# Patient Record
Sex: MALE | Race: WHITE | NOT HISPANIC OR LATINO | Employment: OTHER | URBAN - METROPOLITAN AREA
[De-identification: names, ages, dates, MRNs, and addresses within clinical notes are randomized per-mention and may not be internally consistent; named-entity substitution may affect disease eponyms.]

---

## 2017-01-26 RX ORDER — APIXABAN 5 MG/1
TABLET, FILM COATED ORAL
Qty: 180 TABLET | Refills: 1 | Status: SHIPPED | OUTPATIENT
Start: 2017-01-26 | End: 2017-07-25 | Stop reason: SDUPTHER

## 2017-02-28 ENCOUNTER — TELEPHONE (OUTPATIENT)
Dept: CARDIOLOGY | Facility: CLINIC | Age: 82
End: 2017-02-28

## 2017-03-02 NOTE — TELEPHONE ENCOUNTER
Just confirming with you that it is OK to hold his Eliquis 2 days prior to his implant procedure.

## 2017-05-16 ENCOUNTER — OFFICE VISIT (OUTPATIENT)
Dept: CARDIOLOGY | Facility: CLINIC | Age: 82
End: 2017-05-16

## 2017-05-16 VITALS
HEART RATE: 101 BPM | BODY MASS INDEX: 24.86 KG/M2 | WEIGHT: 158.4 LBS | HEIGHT: 67 IN | DIASTOLIC BLOOD PRESSURE: 68 MMHG | SYSTOLIC BLOOD PRESSURE: 118 MMHG

## 2017-05-16 DIAGNOSIS — I63.9 ACUTE CVA (CEREBROVASCULAR ACCIDENT) (HCC): ICD-10-CM

## 2017-05-16 DIAGNOSIS — Z98.890 HISTORY OF LOOP RECORDER: ICD-10-CM

## 2017-05-16 DIAGNOSIS — E78.5 HYPERLIPIDEMIA LDL GOAL <100: Primary | ICD-10-CM

## 2017-05-16 DIAGNOSIS — I25.2 H/O ACUTE MYOCARDIAL INFARCTION: ICD-10-CM

## 2017-05-16 PROCEDURE — 99213 OFFICE O/P EST LOW 20 MIN: CPT | Performed by: NURSE PRACTITIONER

## 2017-07-25 RX ORDER — APIXABAN 5 MG/1
TABLET, FILM COATED ORAL
Qty: 180 TABLET | Refills: 0 | Status: SHIPPED | OUTPATIENT
Start: 2017-07-25 | End: 2017-10-23 | Stop reason: SDUPTHER

## 2017-10-23 RX ORDER — APIXABAN 5 MG/1
TABLET, FILM COATED ORAL
Qty: 180 TABLET | Refills: 0 | Status: SHIPPED | OUTPATIENT
Start: 2017-10-23 | End: 2017-11-21 | Stop reason: SDUPTHER

## 2017-11-21 ENCOUNTER — OFFICE VISIT (OUTPATIENT)
Dept: CARDIOLOGY | Facility: CLINIC | Age: 82
End: 2017-11-21

## 2017-11-21 VITALS
BODY MASS INDEX: 24.71 KG/M2 | SYSTOLIC BLOOD PRESSURE: 130 MMHG | DIASTOLIC BLOOD PRESSURE: 74 MMHG | HEIGHT: 68 IN | WEIGHT: 163 LBS | HEART RATE: 72 BPM

## 2017-11-21 DIAGNOSIS — E78.5 HYPERLIPIDEMIA LDL GOAL <100: ICD-10-CM

## 2017-11-21 DIAGNOSIS — Z98.890 HISTORY OF LOOP RECORDER: ICD-10-CM

## 2017-11-21 DIAGNOSIS — I63.9 ACUTE CVA (CEREBROVASCULAR ACCIDENT) (HCC): ICD-10-CM

## 2017-11-21 DIAGNOSIS — I25.10 CORONARY ARTERY DISEASE INVOLVING NATIVE CORONARY ARTERY OF NATIVE HEART WITHOUT ANGINA PECTORIS: ICD-10-CM

## 2017-11-21 DIAGNOSIS — I48.0 PAROXYSMAL ATRIAL FIBRILLATION (HCC): Primary | ICD-10-CM

## 2017-11-21 PROCEDURE — 93291 INTERROG DEV EVAL SCRMS IP: CPT | Performed by: INTERNAL MEDICINE

## 2017-11-21 PROCEDURE — 99214 OFFICE O/P EST MOD 30 MIN: CPT | Performed by: INTERNAL MEDICINE

## 2017-11-21 RX ORDER — ATORVASTATIN CALCIUM 40 MG/1
40 TABLET, FILM COATED ORAL NIGHTLY
Qty: 90 TABLET | Refills: 3 | Status: SHIPPED | OUTPATIENT
Start: 2017-11-21 | End: 2018-11-16

## 2017-11-21 NOTE — PROGRESS NOTES
Encounter Date:11/21/2017    Patient ID: Eric Myrick is a  82 y.o. male who resides in [place], [place].    CC/Reason for visit:  Atrial Fibrillation and Hyperlipidemia            Eric Myrick returns to my office in follow up of his atrial fibrillation and hypertension. Since last visit, patient has been doing well from a cardiovascular standpoint.  He denies angina, heart failure symptoms, palpitations, or TIA or stroke symptoms.  He is here for his Loop recorder interrogation which showed no evidence of atrial fibrillation since his last check.  He states that he has had more energy recently that he has in the past.    Review of Systems   Constitution: Negative. Negative for weakness and malaise/fatigue.   HENT: Negative.    Eyes: Negative.  Negative for vision loss in left eye and vision loss in right eye.   Cardiovascular: Negative.  Negative for chest pain, dyspnea on exertion, near-syncope, orthopnea, palpitations, paroxysmal nocturnal dyspnea and syncope.   Respiratory: Negative.    Endocrine: Negative.    Hematologic/Lymphatic: Negative.    Skin: Negative.    Musculoskeletal: Negative.  Negative for myalgias.   Gastrointestinal: Negative.    Genitourinary: Negative.    Neurological: Negative.  Negative for brief paralysis, excessive daytime sleepiness, focal weakness, numbness and paresthesias.   Psychiatric/Behavioral: Negative.    Allergic/Immunologic: Negative.    All other systems reviewed and are negative.      The patient's past medical, social, family history and ROS reviewed in the patient's electronic medical record.    Allergies  Review of patient's allergies indicates no known allergies.    Outpatient Prescriptions Marked as Taking for the 11/21/17 encounter (Office Visit) with Manuel Ragsdale IV, MD   Medication Sig Dispense Refill   • apixaban (ELIQUIS) 5 MG tablet tablet Take 1 tablet by mouth Every 12 (Twelve) Hours for 360 days. 180 tablet 3   • atorvastatin  "(LIPITOR) 40 MG tablet Take 1 tablet by mouth Every Night for 360 days. 90 tablet 3   • omeprazole (PriLOSEC) 40 MG capsule Every Night.     • [DISCONTINUED] atorvastatin (LIPITOR) 40 MG tablet Daily.     • [DISCONTINUED] ELIQUIS 5 MG tablet tablet TAKE 1 TABLET TWICE A  tablet 0         Blood pressure 130/74, pulse 72, height 68\" (172.7 cm), weight 163 lb (73.9 kg).  Body mass index is 24.78 kg/(m^2).    Physical Exam   Constitutional: He is oriented to person, place, and time. He appears well-developed and well-nourished.   HENT:   Head: Normocephalic and atraumatic.   Eyes: Pupils are equal, round, and reactive to light. No scleral icterus.   Neck: No JVD present. Carotid bruit is not present. No thyromegaly present.   Cardiovascular: Normal rate, regular rhythm, S1 normal and S2 normal.  Exam reveals no gallop.    No murmur heard.  Pulmonary/Chest: Effort normal and breath sounds normal.   Abdominal: Soft. There is no hepatosplenomegaly. There is no tenderness.   Neurological: He is alert and oriented to person, place, and time.   Skin: Skin is warm and dry. No cyanosis. Nails show no clubbing.   Psychiatric: He has a normal mood and affect. His behavior is normal.       Data Review:   Procedures     DEVICE INTERROGATION:  11/21/2017, loop recorder interrogation revealed no episodes of atrial fibrillation since last interrogation.         Problem List Items Addressed This Visit        Cardiovascular and Mediastinum    Paroxysmal atrial fibrillation - Primary    Overview     · Confirmed on loop recorder, 2015  · Chads VASC = 5 (age > 75, CVA, CAD)         Current Assessment & Plan     · Asymptomatic  · Continue Eliquis for stroke prophylaxis         Relevant Medications    apixaban (ELIQUIS) 5 MG tablet tablet    Coronary artery disease involving native coronary artery of native heart    Overview     · Cardiac catheterization for acute MI, [place], 1992: Angioplasty only.          Current Assessment & " Plan     · No anginal symptoms  · Continue statin therapy         History of cardioembolic CVA    Overview     · MCA distribution CVA, 11/2014  · Loop recorder placement, 11 2014  · Confirmation of atrial fibrillation on loop recorder interrogation 2015 with initiation of Eliquis         Hyperlipidemia LDL goal <100    Overview     · Recommend moderate intensity statin therapy for stroke prevention         Current Assessment & Plan     · Continue atorvastatin         Relevant Medications    atorvastatin (LIPITOR) 40 MG tablet       Other    History of loop recorder    Overview     · 11/30/2014: 2 episodes of brief atrial fibrillation             Patient is presently asymptomatic for atrial fibrillation, angina, or heart failure.  He is tolerating Eliquis for stroke prophylaxis without bleeding complications.  Loop recorder patient today reveals no atrial fibrillation since last check.       · Continue Eliquis 5 mg twice a day for stroke prophylaxis  · Continue atorvastatin  · Return in about 6 months (around 5/21/2018).      Manuel Ragsdale IV, MD  11/21/2017     Scribed for Manuel Ragsdale IV, MD by Damien Stevens. 11/21/2017  5:34 PM

## 2018-05-22 ENCOUNTER — OFFICE VISIT (OUTPATIENT)
Dept: CARDIOLOGY | Facility: CLINIC | Age: 83
End: 2018-05-22

## 2018-05-22 VITALS
DIASTOLIC BLOOD PRESSURE: 60 MMHG | HEIGHT: 66 IN | HEART RATE: 79 BPM | BODY MASS INDEX: 26.03 KG/M2 | WEIGHT: 162 LBS | SYSTOLIC BLOOD PRESSURE: 142 MMHG

## 2018-05-22 DIAGNOSIS — Z98.890 HISTORY OF LOOP RECORDER: Primary | ICD-10-CM

## 2018-05-22 DIAGNOSIS — E78.5 HYPERLIPIDEMIA LDL GOAL <100: ICD-10-CM

## 2018-05-22 DIAGNOSIS — I25.10 CORONARY ARTERY DISEASE INVOLVING NATIVE CORONARY ARTERY OF NATIVE HEART WITHOUT ANGINA PECTORIS: ICD-10-CM

## 2018-05-22 DIAGNOSIS — I48.0 PAROXYSMAL ATRIAL FIBRILLATION (HCC): ICD-10-CM

## 2018-05-22 PROCEDURE — 99214 OFFICE O/P EST MOD 30 MIN: CPT | Performed by: NURSE PRACTITIONER

## 2018-05-22 NOTE — ASSESSMENT & PLAN NOTE
· No atrial fibrillation detected on Loop recorder interrogation today.  · Device interrogation in 6 months

## 2018-05-22 NOTE — ASSESSMENT & PLAN NOTE
· No signs or symptoms of TIA or stroke  · Continue Elliquis 5 mg twice a day  · No atrial fibrillation detected on loop recorder interrogation

## 2018-07-29 ENCOUNTER — APPOINTMENT (OUTPATIENT)
Dept: GENERAL RADIOLOGY | Facility: HOSPITAL | Age: 83
End: 2018-07-29

## 2018-07-29 ENCOUNTER — HOSPITAL ENCOUNTER (EMERGENCY)
Facility: HOSPITAL | Age: 83
Discharge: HOME | End: 2018-07-29
Attending: EMERGENCY MEDICINE | Admitting: EMERGENCY MEDICINE

## 2018-07-29 VITALS
HEIGHT: 66 IN | HEART RATE: 71 BPM | OXYGEN SATURATION: 95 % | SYSTOLIC BLOOD PRESSURE: 153 MMHG | DIASTOLIC BLOOD PRESSURE: 73 MMHG | BODY MASS INDEX: 25.23 KG/M2 | WEIGHT: 157 LBS | RESPIRATION RATE: 18 BRPM | TEMPERATURE: 98.2 F

## 2018-07-29 DIAGNOSIS — R55 NEAR SYNCOPE: Primary | ICD-10-CM

## 2018-07-29 LAB
ALBUMIN SERPL-MCNC: 3.99 G/DL (ref 3.2–4.8)
ALBUMIN/GLOB SERPL: 1.4 G/DL (ref 1.5–2.5)
ALP SERPL-CCNC: 123 U/L (ref 25–100)
ALT SERPL W P-5'-P-CCNC: 10 U/L (ref 7–40)
ANION GAP SERPL CALCULATED.3IONS-SCNC: 6 MMOL/L (ref 3–11)
AST SERPL-CCNC: 15 U/L (ref 0–33)
BASOPHILS # BLD AUTO: 0.04 10*3/MM3 (ref 0–0.2)
BASOPHILS NFR BLD AUTO: 0.5 % (ref 0–1)
BILIRUB SERPL-MCNC: 0.8 MG/DL (ref 0.3–1.2)
BILIRUB UR QL STRIP: NEGATIVE
BUN BLD-MCNC: 16 MG/DL (ref 9–23)
BUN/CREAT SERPL: 19.3 (ref 7–25)
CALCIUM SPEC-SCNC: 8.9 MG/DL (ref 8.7–10.4)
CHLORIDE SERPL-SCNC: 107 MMOL/L (ref 99–109)
CLARITY UR: CLEAR
CO2 SERPL-SCNC: 28 MMOL/L (ref 20–31)
COLOR UR: YELLOW
CREAT BLD-MCNC: 0.83 MG/DL (ref 0.6–1.3)
DEPRECATED RDW RBC AUTO: 46.4 FL (ref 37–54)
EOSINOPHIL # BLD AUTO: 0.18 10*3/MM3 (ref 0–0.3)
EOSINOPHIL NFR BLD AUTO: 2.2 % (ref 0–3)
ERYTHROCYTE [DISTWIDTH] IN BLOOD BY AUTOMATED COUNT: 13.4 % (ref 11.3–14.5)
GFR SERPL CREATININE-BSD FRML MDRD: 89 ML/MIN/1.73
GLOBULIN UR ELPH-MCNC: 2.8 GM/DL
GLUCOSE BLD-MCNC: 111 MG/DL (ref 70–100)
GLUCOSE UR STRIP-MCNC: NEGATIVE MG/DL
HCT VFR BLD AUTO: 45.1 % (ref 38.9–50.9)
HGB BLD-MCNC: 14.6 G/DL (ref 13.1–17.5)
HGB UR QL STRIP.AUTO: NEGATIVE
HOLD SPECIMEN: NORMAL
HOLD SPECIMEN: NORMAL
IMM GRANULOCYTES # BLD: 0.01 10*3/MM3 (ref 0–0.03)
IMM GRANULOCYTES NFR BLD: 0.1 % (ref 0–0.6)
KETONES UR QL STRIP: NEGATIVE
LEUKOCYTE ESTERASE UR QL STRIP.AUTO: NEGATIVE
LYMPHOCYTES # BLD AUTO: 2.52 10*3/MM3 (ref 0.6–4.8)
LYMPHOCYTES NFR BLD AUTO: 31.1 % (ref 24–44)
MAGNESIUM SERPL-MCNC: 2.6 MG/DL (ref 1.3–2.7)
MCH RBC QN AUTO: 30.6 PG (ref 27–31)
MCHC RBC AUTO-ENTMCNC: 32.4 G/DL (ref 32–36)
MCV RBC AUTO: 94.5 FL (ref 80–99)
MONOCYTES # BLD AUTO: 0.6 10*3/MM3 (ref 0–1)
MONOCYTES NFR BLD AUTO: 7.4 % (ref 0–12)
NEUTROPHILS # BLD AUTO: 4.75 10*3/MM3 (ref 1.5–8.3)
NEUTROPHILS NFR BLD AUTO: 58.7 % (ref 41–71)
NITRITE UR QL STRIP: NEGATIVE
PH UR STRIP.AUTO: 6.5 [PH] (ref 5–8)
PLATELET # BLD AUTO: 183 10*3/MM3 (ref 150–450)
PMV BLD AUTO: 10.2 FL (ref 6–12)
POTASSIUM BLD-SCNC: 4.1 MMOL/L (ref 3.5–5.5)
PROT SERPL-MCNC: 6.8 G/DL (ref 5.7–8.2)
PROT UR QL STRIP: NEGATIVE
RBC # BLD AUTO: 4.77 10*6/MM3 (ref 4.2–5.76)
SODIUM BLD-SCNC: 141 MMOL/L (ref 132–146)
SP GR UR STRIP: 1.02 (ref 1–1.03)
TROPONIN I SERPL-MCNC: 0 NG/ML (ref 0–0.07)
TROPONIN I SERPL-MCNC: 0 NG/ML (ref 0–0.07)
UROBILINOGEN UR QL STRIP: NORMAL
WBC NRBC COR # BLD: 8.1 10*3/MM3 (ref 3.5–10.8)
WHOLE BLOOD HOLD SPECIMEN: NORMAL
WHOLE BLOOD HOLD SPECIMEN: NORMAL

## 2018-07-29 PROCEDURE — 80053 COMPREHEN METABOLIC PANEL: CPT | Performed by: EMERGENCY MEDICINE

## 2018-07-29 PROCEDURE — 71045 X-RAY EXAM CHEST 1 VIEW: CPT

## 2018-07-29 PROCEDURE — 81003 URINALYSIS AUTO W/O SCOPE: CPT | Performed by: EMERGENCY MEDICINE

## 2018-07-29 PROCEDURE — 93005 ELECTROCARDIOGRAM TRACING: CPT | Performed by: EMERGENCY MEDICINE

## 2018-07-29 PROCEDURE — 83735 ASSAY OF MAGNESIUM: CPT | Performed by: EMERGENCY MEDICINE

## 2018-07-29 PROCEDURE — 36415 COLL VENOUS BLD VENIPUNCTURE: CPT

## 2018-07-29 PROCEDURE — 84484 ASSAY OF TROPONIN QUANT: CPT

## 2018-07-29 PROCEDURE — 99285 EMERGENCY DEPT VISIT HI MDM: CPT

## 2018-07-29 PROCEDURE — 85025 COMPLETE CBC W/AUTO DIFF WBC: CPT | Performed by: EMERGENCY MEDICINE

## 2018-07-29 RX ORDER — SODIUM CHLORIDE 0.9 % (FLUSH) 0.9 %
10 SYRINGE (ML) INJECTION AS NEEDED
Status: DISCONTINUED | OUTPATIENT
Start: 2018-07-29 | End: 2018-07-29 | Stop reason: HOSPADM

## 2019-02-10 ENCOUNTER — APPOINTMENT (OUTPATIENT)
Dept: CT IMAGING | Facility: HOSPITAL | Age: 84
End: 2019-02-10

## 2019-02-10 ENCOUNTER — HOSPITAL ENCOUNTER (EMERGENCY)
Facility: HOSPITAL | Age: 84
Discharge: HOME | End: 2019-02-10
Attending: EMERGENCY MEDICINE | Admitting: EMERGENCY MEDICINE

## 2019-02-10 VITALS
HEIGHT: 67 IN | WEIGHT: 160 LBS | HEART RATE: 79 BPM | SYSTOLIC BLOOD PRESSURE: 162 MMHG | OXYGEN SATURATION: 95 % | DIASTOLIC BLOOD PRESSURE: 78 MMHG | RESPIRATION RATE: 20 BRPM | TEMPERATURE: 98.3 F | BODY MASS INDEX: 25.11 KG/M2

## 2019-02-10 DIAGNOSIS — S00.03XA CONTUSION OF SCALP, INITIAL ENCOUNTER: ICD-10-CM
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
## 2019-02-10 DIAGNOSIS — S01.81XA FACE LACERATIONS, INITIAL ENCOUNTER: Primary | ICD-10-CM

## 2019-02-10 DIAGNOSIS — Z79.01 ANTICOAGULATED: ICD-10-CM

## 2019-02-10 PROCEDURE — 99283 EMERGENCY DEPT VISIT LOW MDM: CPT

## 2019-02-10 PROCEDURE — 70450 CT HEAD/BRAIN W/O DYE: CPT

## 2019-02-10 RX ORDER — LIDOCAINE HYDROCHLORIDE 10 MG/ML
5 INJECTION, SOLUTION EPIDURAL; INFILTRATION; INTRACAUDAL; PERINEURAL ONCE
Status: COMPLETED | OUTPATIENT
Start: 2019-02-10 | End: 2019-02-10

## 2019-02-10 RX ORDER — FUROSEMIDE 10 MG/ML
40 INJECTION INTRAMUSCULAR; INTRAVENOUS ONCE
Status: DISCONTINUED | OUTPATIENT
Start: 2019-02-10 | End: 2019-02-10

## 2019-02-10 RX ORDER — ATORVASTATIN CALCIUM 40 MG/1
40 TABLET, FILM COATED ORAL DAILY
COMMUNITY
End: 2019-03-12 | Stop reason: SDUPTHER

## 2019-02-10 RX ADMIN — LIDOCAINE HYDROCHLORIDE 5 ML: 10 INJECTION, SOLUTION EPIDURAL; INFILTRATION; INTRACAUDAL; PERINEURAL at 21:12

## 2019-02-11 NOTE — ED PROVIDER NOTES
Subjective   83-year-old male presents to the emergency department for evaluation after a fall.  The patient states he slipped on wet grass and hit his left forehead against the sidewalk.  He sustained a small laceration over the right brow.  No loss of consciousness.  No headache.  He denies any neck pain or back pain or extremity pain.  The patient is on Eliquis.  He has a history of prior CVA 4 years ago and one brief episode of atrial fibrillation.  He has had previous MI 30 yrs ago.              Review of Systems    Past Medical History:   Diagnosis Date   • Acute CVA (cerebrovascular accident) (CMS/HCC) 11/20/2014   • Atrial fibrillation (CMS/HCC)    • Former tobacco use    • GERD (gastroesophageal reflux disease)    • H/O acute myocardial infarction 12/17/1992   • History of loop recorder 11/30/2014    TWO EPISODES OF BRIEF ATRIAL FIBRILLATION   • Hyperlipidemia    • Obstructive sleep apnea on CPAP        No Known Allergies    Past Surgical History:   Procedure Laterality Date   • CORONARY ANGIOPLASTY Left 12/17/1992    ANGIOPLASTY ONLY; DATA DEFICIT   • HERNIA REPAIR Bilateral    • THROMBECTOMY         Family History   Problem Relation Age of Onset   • Cancer Mother    • No Known Problems Father        Social History     Socioeconomic History   • Marital status:      Spouse name: Not on file   • Number of children: Not on file   • Years of education: Not on file   • Highest education level: Not on file   Tobacco Use   • Smoking status: Former Smoker     Types: Cigarettes   • Smokeless tobacco: Never Used   • Tobacco comment: quit 45 years ago   Substance and Sexual Activity   • Alcohol use: No   • Drug use: No   • Sexual activity: Defer           Objective   Physical Exam    Laceration Repair  Date/Time: 2/10/2019 9:47 PM  Performed by: Joaquim Birmingham PA  Authorized by: Kedar Sims MD     Consent:     Consent obtained:  Verbal    Consent given by:  Patient    Risks discussed:  Pain, poor  cosmetic result and poor wound healing  Anesthesia (see MAR for exact dosages):     Anesthesia method:  Local infiltration    Local anesthetic:  Lidocaine 1% w/o epi  Laceration details:     Location:  Face    Face location:  L eyebrow    Length (cm):  2.5  Repair type:     Repair type:  Simple  Pre-procedure details:     Preparation:  Patient was prepped and draped in usual sterile fashion  Exploration:     Hemostasis achieved with:  Direct pressure    Wound exploration: wound explored through full range of motion      Contaminated: no    Treatment:     Area cleansed with:  Betadine    Amount of cleaning:  Standard    Irrigation solution:  Sterile saline    Irrigation method:  Syringe  Skin repair:     Repair method:  Sutures    Suture size:  5-0    Suture material:  Nylon    Suture technique:  Simple interrupted    Number of sutures:  6  Approximation:     Approximation:  Close    Vermilion border: well-aligned    Post-procedure details:     Dressing:  Sterile dressing    Patient tolerance of procedure:  Tolerated well, no immediate complications               ED Course      CT head shows no acute fx or bleed.  The wound was closed (see procedure note).  Will d/c home to f/u as needed.  No results found for this or any previous visit (from the past 24 hour(s)).  Note: In addition to lab results from this visit, the labs listed above may include labs taken at another facility or during a different encounter within the last 24 hours. Please correlate lab times with ED admission and discharge times for further clarification of the services performed during this visit.    CT Head Without Contrast   Final Result   No evidence for acute transcortical infarct, acute intracranial hemorrhage, or    mass effect.       THIS DOCUMENT HAS BEEN ELECTRONICALLY SIGNED BY CLAUDIA CRUZ MD        Vitals:    02/10/19 1959   BP: (!) 185/91   Pulse: 85   Resp: 20   Temp: 98.3 °F (36.8 °C)   SpO2: 94%   Weight: 72.6 kg (160 lb)   Height:  "170.2 cm (67\")     Medications   lidocaine PF 1% (XYLOCAINE) injection 5 mL (5 mL Injection Given 2/10/19 2112)     ECG/EMG Results (last 24 hours)     ** No results found for the last 24 hours. **        No orders to display                   MDM      Final diagnoses:   Face lacerations, initial encounter   Contusion of scalp, initial encounter   Fall, initial encounter   Anticoagulated            Joaquim Birmingham, PA  02/10/19 6539    "

## 2019-02-18 ENCOUNTER — HOSPITAL ENCOUNTER (EMERGENCY)
Facility: HOSPITAL | Age: 84
Discharge: HOME | End: 2019-02-18

## 2019-02-18 VITALS
TEMPERATURE: 98 F | HEIGHT: 67 IN | HEART RATE: 75 BPM | OXYGEN SATURATION: 98 % | DIASTOLIC BLOOD PRESSURE: 73 MMHG | BODY MASS INDEX: 25.11 KG/M2 | WEIGHT: 160 LBS | SYSTOLIC BLOOD PRESSURE: 120 MMHG | RESPIRATION RATE: 16 BRPM

## 2019-02-18 PROCEDURE — 99201: CPT

## 2019-03-08 NOTE — PROGRESS NOTES
[facility] at [facility]  Outpatient Follow-up Visit    Eric Myrick  1935  PCP: Ranjit Caban MD      ID:  Eric Myrick is a 83 y.o. male who is  and resides in [place], [place].    Chief Complaint   Patient presents with   • Atrial Fibrillation   • Cerebrovascular Accident            The patient returns today for his 6 month follow up of his atrial fibrillation, history of coronary artery disease, and hyperlipidemia.  The patient has been doing well from a cardiovascular standpoint.  He has had no recurrent CVA symptoms since starting Eliquis.  He denies exertional chest pain.  His loop recorder battery has  and no longer is transmitting.      No Known Allergies    Current Outpatient Medications:   •  apixaban (ELIQUIS) 5 MG tablet tablet, Take 1 tablet by mouth Every 12 (Twelve) Hours., Disp: 180 tablet, Rfl: 3  •  atorvastatin (LIPITOR) 40 MG tablet, Take 1 tablet by mouth Daily., Disp: , Rfl:   •  B Complex Vitamins (VITAMIN B COMPLEX PO), Take  by mouth Daily., Disp: , Rfl:   •  Multiple Vitamins-Minerals (MULTIVITAMIN ADULT PO), Take  by mouth Daily., Disp: , Rfl:   •  omeprazole (PriLOSEC) 40 MG capsule, Every Night., Disp: , Rfl:     Past Medical History, Past Surgical History, Family history, Social History, and Medications were all reviewed with the patient today and updated as necessary.     Past Medical History:   Diagnosis Date   • Acute CVA (cerebrovascular accident) (CMS/HCC) 2014   • Atrial fibrillation (CMS/HCC)    • Former tobacco use    • GERD (gastroesophageal reflux disease)    • H/O acute myocardial infarction 1992   • History of loop recorder 2014    TWO EPISODES OF BRIEF ATRIAL FIBRILLATION   • Hyperlipidemia    • Obstructive sleep apnea on CPAP      Past Surgical History:   Procedure Laterality Date   • CORONARY ANGIOPLASTY Left 1992    ANGIOPLASTY ONLY; DATA DEFICIT   • HERNIA REPAIR Bilateral    •  "THROMBECTOMY       Family History   Problem Relation Age of Onset   • Cancer Mother    • No Known Problems Father      Social History     Tobacco Use   • Smoking status: Former Smoker     Types: Cigarettes   • Smokeless tobacco: Never Used   • Tobacco comment: quit 45 years ago   Substance Use Topics   • Alcohol use: No       Review of Systems   Constitution: Negative for weakness and malaise/fatigue.   HENT: Positive for ear pain.    Eyes: Negative for vision loss in left eye and vision loss in right eye.   Cardiovascular: Negative for chest pain, dyspnea on exertion, near-syncope, orthopnea, palpitations, paroxysmal nocturnal dyspnea and syncope.   Musculoskeletal: Positive for arthritis and back pain. Negative for myalgias.   Neurological: Negative for brief paralysis, excessive daytime sleepiness, focal weakness, numbness and paresthesias.   All other systems reviewed and are negative.              /72 (BP Location: Right arm, Patient Position: Sitting)   Pulse 74   Ht 170.2 cm (67\")   Wt 74.8 kg (164 lb 12.8 oz)   BMI 25.81 kg/m²        Wt Readings from Last 5 Encounters:   03/12/19 74.8 kg (164 lb 12.8 oz)   02/18/19 72.6 kg (160 lb)   02/10/19 72.6 kg (160 lb)   07/29/18 71.2 kg (157 lb)   06/14/18 73.5 kg (162 lb)       BP Readings from Last 5 Encounters:   03/12/19 130/72   02/18/19 120/73   02/10/19 162/78   07/29/18 153/73   06/14/18 144/75       Physical Exam   Constitutional: He is oriented to person, place, and time. He appears well-developed and well-nourished.   HENT:   Head: Normocephalic and atraumatic.   Eyes: Pupils are equal, round, and reactive to light. No scleral icterus.   Neck: No JVD present. Carotid bruit is not present. No thyromegaly present.   Cardiovascular: Normal rate, regular rhythm, S1 normal and S2 normal. Exam reveals no gallop.   No murmur heard.  Pulmonary/Chest: Effort normal and breath sounds normal.   Abdominal: Soft. There is no hepatosplenomegaly. There is no " tenderness.   Neurological: He is alert and oriented to person, place, and time.   Skin: Skin is warm and dry. No cyanosis. Nails show no clubbing.   Psychiatric: He has a normal mood and affect. His behavior is normal.       Procedures       Lab Results   Component Value Date    GLUCOSE 111 (H) 07/29/2018    BUN 16 07/29/2018    CREATININE 0.83 07/29/2018    EGFRIFNONA 89 07/29/2018    BCR 19.3 07/29/2018    K 4.1 07/29/2018    CO2 28.0 07/29/2018    CALCIUM 8.9 07/29/2018    ALBUMIN 3.99 07/29/2018    AST 15 07/29/2018    ALT 10 07/29/2018          Problem List Items Addressed This Visit        Cardiology Problems    Paroxysmal atrial fibrillation (CMS/HCC) - Primary    Overview     · Confirmed on loop recorder, 2015  · Chads VASC = 5 (age > 75, CVA, CAD)         Current Assessment & Plan     · Asymptomatic  · Continue Eliquis for stroke prophylaxis         Relevant Medications    apixaban (ELIQUIS) 5 MG tablet tablet    Hyperlipidemia LDL goal <100    Overview     · Recommend moderate intensity statin therapy for stroke prevention         Current Assessment & Plan     Continue atorvastatin         Relevant Medications    atorvastatin (LIPITOR) 40 MG tablet               · Continue present medical therapy including Eliquis and atorvastatin  Return in about 6 months (around 9/12/2019).          MILADIS Ragsdale M.D., [facility], [facility]  Interventional Cardiology  3/12/2019  5:38 PM

## 2019-03-12 ENCOUNTER — OFFICE VISIT (OUTPATIENT)
Dept: CARDIOLOGY | Facility: CLINIC | Age: 84
End: 2019-03-12

## 2019-03-12 VITALS
BODY MASS INDEX: 25.87 KG/M2 | WEIGHT: 164.8 LBS | HEIGHT: 67 IN | SYSTOLIC BLOOD PRESSURE: 130 MMHG | HEART RATE: 74 BPM | DIASTOLIC BLOOD PRESSURE: 72 MMHG

## 2019-03-12 DIAGNOSIS — E78.5 HYPERLIPIDEMIA LDL GOAL <100: ICD-10-CM

## 2019-03-12 DIAGNOSIS — I48.0 PAROXYSMAL ATRIAL FIBRILLATION (HCC): Primary | ICD-10-CM

## 2019-03-12 PROCEDURE — 99213 OFFICE O/P EST LOW 20 MIN: CPT | Performed by: INTERNAL MEDICINE

## 2019-03-12 RX ORDER — ATORVASTATIN CALCIUM 40 MG/1
40 TABLET, FILM COATED ORAL DAILY
Start: 2019-03-12 | End: 2021-09-28 | Stop reason: SDUPTHER

## 2019-03-15 DIAGNOSIS — I48.0 PAROXYSMAL ATRIAL FIBRILLATION (HCC): ICD-10-CM

## 2019-03-25 ENCOUNTER — OFFICE VISIT (OUTPATIENT)
Dept: ORTHOPEDIC SURGERY | Facility: CLINIC | Age: 84
End: 2019-03-25

## 2019-03-25 VITALS — BODY MASS INDEX: 25.95 KG/M2 | HEIGHT: 67 IN | OXYGEN SATURATION: 95 % | HEART RATE: 85 BPM | WEIGHT: 165.34 LBS

## 2019-03-25 DIAGNOSIS — M16.12 PRIMARY OSTEOARTHRITIS OF LEFT HIP: Primary | ICD-10-CM

## 2019-03-25 PROCEDURE — 99203 OFFICE O/P NEW LOW 30 MIN: CPT | Performed by: ORTHOPAEDIC SURGERY

## 2019-03-25 NOTE — PROGRESS NOTES
[facility] Note    Subjective     Chief Complaint   Patient presents with   • Left Hip - Pain        HPI    Eric Myrick is a 83 y.o. male.  He presents today for evaluation of left hip pain.  Pain has been bothering him for approximately the past 2 years, following no injury.  The pain is associated with stiffness, and worsens with walking and climbing stairs.  It is relieved somewhat by sitting or lying down.  The pain is 5 out of 10 currently, and is throbbing in nature.  He uses a cane for ambulation, and has tried anti-inflammatories.  He is not interested in surgical intervention at this time.      Patient Active Problem List   Diagnosis   • History of cardioembolic CVA   • History of loop recorder   • Coronary artery disease involving native coronary artery of native heart without angina pectoris   • Hyperlipidemia LDL goal <100   • GERD (gastroesophageal reflux disease)   • Obstructive sleep apnea on CPAP   • Paroxysmal atrial fibrillation (CMS/HCC)     Past Medical History:   Diagnosis Date   • Acute CVA (cerebrovascular accident) (CMS/HCC) 11/20/2014   • Atrial fibrillation (CMS/HCC)    • Former tobacco use    • GERD (gastroesophageal reflux disease)    • H/O acute myocardial infarction 12/17/1992   • History of loop recorder 11/30/2014    TWO EPISODES OF BRIEF ATRIAL FIBRILLATION   • Hyperlipidemia    • Obstructive sleep apnea on CPAP       Past Surgical History:   Procedure Laterality Date   • CORONARY ANGIOPLASTY Left 12/17/1992    ANGIOPLASTY ONLY; DATA DEFICIT   • HERNIA REPAIR Bilateral    • THROMBECTOMY        Family History   Problem Relation Age of Onset   • Cancer Mother    • No Known Problems Father      Social History     Socioeconomic History   • Marital status:      Spouse name: Not on file   • Number of children: Not on file   • Years of education: Not on file   • Highest education level: Not on file   Tobacco Use   • Smoking status: Former Smoker      Types: Cigarettes   • Smokeless tobacco: Never Used   • Tobacco comment: quit 45 years ago   Substance and Sexual Activity   • Alcohol use: No   • Drug use: No   • Sexual activity: Defer      Current Outpatient Medications on File Prior to Visit   Medication Sig Dispense Refill   • apixaban (ELIQUIS) 5 MG tablet tablet Take 1 tablet by mouth Every 12 (Twelve) Hours. 180 tablet 3   • atorvastatin (LIPITOR) 40 MG tablet Take 1 tablet by mouth Daily.     • B Complex Vitamins (VITAMIN B COMPLEX PO) Take  by mouth Daily.     • Multiple Vitamins-Minerals (MULTIVITAMIN ADULT PO) Take  by mouth Daily.     • omeprazole (PriLOSEC) 40 MG capsule Every Night.       No current facility-administered medications on file prior to visit.       No Known Allergies     Review of Systems   Constitutional: Negative.  Negative for activity change, appetite change, chills, diaphoresis, fatigue, fever and unexpected weight change.   HENT: Negative.  Negative for congestion, dental problem, drooling, ear discharge, ear pain, facial swelling, hearing loss, mouth sores, nosebleeds, postnasal drip, rhinorrhea, sinus pressure, sneezing, sore throat, tinnitus, trouble swallowing and voice change.    Eyes: Negative.  Negative for photophobia, pain, discharge, redness, itching and visual disturbance.   Respiratory: Negative.  Negative for apnea, cough, choking, chest tightness, shortness of breath, wheezing and stridor.    Cardiovascular: Negative.  Negative for chest pain, palpitations and leg swelling.   Gastrointestinal: Negative.  Negative for abdominal distention, abdominal pain, anal bleeding, blood in stool, constipation, diarrhea, nausea, rectal pain and vomiting.   Endocrine: Negative.  Negative for cold intolerance, heat intolerance, polydipsia, polyphagia and polyuria.   Genitourinary: Negative.  Negative for decreased urine volume, difficulty urinating, dysuria, enuresis, flank pain, frequency, genital sores, hematuria and urgency.  "  Musculoskeletal: Positive for arthralgias, back pain and joint swelling. Negative for gait problem, myalgias, neck pain and neck stiffness.   Skin: Negative.  Negative for color change, pallor, rash and wound.   Allergic/Immunologic: Negative.  Negative for environmental allergies, food allergies and immunocompromised state.   Neurological: Negative.  Negative for dizziness, tremors, seizures, syncope, facial asymmetry, speech difficulty, weakness, light-headedness, numbness and headaches.   Hematological: Negative.  Negative for adenopathy. Does not bruise/bleed easily.   Psychiatric/Behavioral: Negative.  Negative for agitation, behavioral problems, confusion, decreased concentration, dysphoric mood, hallucinations, self-injury, sleep disturbance and suicidal ideas. The patient is not nervous/anxious and is not hyperactive.         Objective      Physical Exam  Pulse 85   Ht 170.2 cm (67.01\")   Wt 75 kg (165 lb 5.5 oz)   SpO2 95%   BMI 25.89 kg/m²     Body mass index is 25.89 kg/m².    General:   Mental Status:  Alert   Appearance: Cooperative, in no acute distress   Build and Nutrition: Well-nourished well-developed male   Orientation: Alert and oriented to person, place and time   Posture: Normal   Gait: Limping on the left    Integument:   Left hip: No skin lesions, no rash, no ecchymosis    Neurologic:   Sensation:    Left foot: Intact to light touch on the dorsal and plantar aspect   Motor:  Left lower extremity: 5/5 quadriceps, hamstrings, ankle dorsiflexors, and ankle plantar flexors  Vascular:   Left lower extremity: 2+ dorsalis pedis pulse, prompt capillary refill    Lower Extremity:   Left Hip:    Tenderness:  None    Swelling:  None    Crepitus:  None    Atrophy:  None    Range of motion:  External Rotation: 20°       Internal Rotation: 10°       Flexion:  90°       Extension:  0°   Instability:  None  Deformities:  None  Functional testing: Negative [facility]    Slightly short on the left " compared to the right      Imaging/Studies  Imaging Results (last 24 hours)     Procedure Component Value Units Date/Time    XR Hip With or Without Pelvis 2 - 3 View Left [[id number]] Resulted:  03/25/19 1608     Updated:  03/25/19 1609    Narrative:       Left Hip Radiographs  Indication: left hip pain  Views: low AP pelvis and lateral of the left hip    Comparison: no prior studies available for review    Findings:   Bone-on-bone contact in the left hip joint, consistent with advanced   osteoarthritis, with thickening of the femoral neck, and osteophyte   formation.  No acute bony abnormalities.            Assessment and Plan     Eric was seen today for pain.    Diagnoses and all orders for this visit:    Primary osteoarthritis of left hip  -     XR Hip With or Without Pelvis 2 - 3 View Left  -     External Facility Surgical/Procedural Request; Future        1. Primary osteoarthritis of left hip        I reviewed my findings with the patient and his wife today.  He does have advanced left hip arthritis, and at this point is not interested in surgical intervention.  He would prefer a trial of an intra-articular injection, and we will arrange this at a mutually convenient time.    Return in about 4 weeks (around 4/22/2019) for After Hip Injection.      Medical Decision Making  Management Options : prescription/IM medicine  Data/Risk: radiology tests and independent visualization of imaging, lab tests, or EMG/NCV      Melvin Kaba MD  03/25/19  7:03 PM

## 2019-03-28 ENCOUNTER — TELEPHONE (OUTPATIENT)
Dept: ORTHOPEDIC SURGERY | Facility: CLINIC | Age: 84
End: 2019-03-28

## 2019-03-28 NOTE — TELEPHONE ENCOUNTER
----- Message from Melvin Kaba MD sent at 3/27/2019  4:58 PM EDT -----  It would be best for him to be off of that for 5 days before the injection, if he gets clearance from his cardiologist to do so.    ----- Message -----  From: Ansley Miramontes  Sent: 3/27/2019   9:28 AM  To: Melvin Kaba MD    PATIENT CALLED WANTING TO MAKE SURE IT IS OK TO CONTINUE HIS ELIQUIS PRIOR TO HIP INJECTION SCHD 4/12/19    TXS

## 2019-04-12 ENCOUNTER — OUTSIDE FACILITY SERVICE (OUTPATIENT)
Dept: ORTHOPEDIC SURGERY | Facility: CLINIC | Age: 84
End: 2019-04-12

## 2019-04-12 PROCEDURE — 77002 NEEDLE LOCALIZATION BY XRAY: CPT | Performed by: ORTHOPAEDIC SURGERY

## 2019-04-12 PROCEDURE — 20610 DRAIN/INJ JOINT/BURSA W/O US: CPT | Performed by: ORTHOPAEDIC SURGERY

## 2019-05-13 ENCOUNTER — OFFICE VISIT (OUTPATIENT)
Dept: ORTHOPEDIC SURGERY | Facility: CLINIC | Age: 84
End: 2019-05-13

## 2019-05-13 VITALS — HEIGHT: 67 IN | WEIGHT: 160.05 LBS | BODY MASS INDEX: 25.12 KG/M2 | OXYGEN SATURATION: 95 % | HEART RATE: 75 BPM

## 2019-05-13 DIAGNOSIS — M16.12 PRIMARY OSTEOARTHRITIS OF LEFT HIP: Primary | ICD-10-CM

## 2019-05-13 PROCEDURE — 99212 OFFICE O/P EST SF 10 MIN: CPT | Performed by: ORTHOPAEDIC SURGERY

## 2019-05-13 NOTE — PROGRESS NOTES
[facility] [place] [facility] Note    Subjective     Chief Complaint   Patient presents with   • Left Hip - Follow-up     Follow up after injection. 4/12/19        HPI    Eric Myrick is a 83 y.o. male.  He follows up today for his left hip.  He did have good relief with the intra-articular hip injection was provided about 4 weeks ago.  0 out of 10 pain currently.      Patient Active Problem List   Diagnosis   • History of cardioembolic CVA   • History of loop recorder   • Coronary artery disease involving native coronary artery of native heart without angina pectoris   • Hyperlipidemia LDL goal <100   • GERD (gastroesophageal reflux disease)   • Obstructive sleep apnea on CPAP   • Paroxysmal atrial fibrillation (CMS/HCC)     Past Medical History:   Diagnosis Date   • Acute CVA (cerebrovascular accident) (CMS/HCC) 11/20/2014   • Atrial fibrillation (CMS/HCC)    • Former tobacco use    • GERD (gastroesophageal reflux disease)    • H/O acute myocardial infarction 12/17/1992   • History of loop recorder 11/30/2014    TWO EPISODES OF BRIEF ATRIAL FIBRILLATION   • Hyperlipidemia    • Obstructive sleep apnea on CPAP       Past Surgical History:   Procedure Laterality Date   • CORONARY ANGIOPLASTY Left 12/17/1992    ANGIOPLASTY ONLY; DATA DEFICIT   • HERNIA REPAIR Bilateral    • THROMBECTOMY        Family History   Problem Relation Age of Onset   • Cancer Mother    • No Known Problems Father      Social History     Socioeconomic History   • Marital status:      Spouse name: Not on file   • Number of children: Not on file   • Years of education: Not on file   • Highest education level: Not on file   Tobacco Use   • Smoking status: Former Smoker     Types: Cigarettes   • Smokeless tobacco: Never Used   • Tobacco comment: quit 45 years ago   Substance and Sexual Activity   • Alcohol use: No   • Drug use: No   • Sexual activity: Defer      Current Outpatient Medications on File Prior to Visit   Medication  "Sig Dispense Refill   • apixaban (ELIQUIS) 5 MG tablet tablet Take 1 tablet by mouth Every 12 (Twelve) Hours. 180 tablet 3   • atorvastatin (LIPITOR) 40 MG tablet Take 1 tablet by mouth Daily.     • B Complex Vitamins (VITAMIN B COMPLEX PO) Take  by mouth Daily.     • Multiple Vitamins-Minerals (MULTIVITAMIN ADULT PO) Take  by mouth Daily.     • omeprazole (PriLOSEC) 40 MG capsule Every Night.       No current facility-administered medications on file prior to visit.       No Known Allergies     Review of Systems   Constitutional: Positive for activity change.   Eyes: Negative.    Respiratory: Negative.    Cardiovascular: Negative.    Gastrointestinal: Negative.    Endocrine: Negative.    Genitourinary: Negative.    Musculoskeletal: Positive for arthralgias (lefth ip).   Skin: Negative.    Allergic/Immunologic: Negative.    Neurological: Negative.    Hematological: Negative.    Psychiatric/Behavioral: Negative.         Objective      Physical Exam  Pulse 75   Ht 170.2 cm (67.01\")   Wt 72.6 kg (160 lb 0.9 oz)   SpO2 95%   BMI 25.06 kg/m²     Body mass index is 25.06 kg/m².    General:   Mental Status:  Alert   Appearance: Cooperative, in no acute distress   Build and Nutrition: Well-nourished well-developed male   Orientation: Alert and oriented to person, place and time   Posture: Normal   Gait: Walks with a cane, with unsteady gait    Lower Extremity:   Left Hip:    Tenderness:  None    Swelling:  None    Crepitus:  None    Range of motion:  External Rotation: 20°       Internal Rotation: 20°       Flexion:  90°       Extension:  0°    Deformities:  None  Functional testing: Negative [facility]    Slightly short on the left compared to the right      Assessment and Plan     Eric was seen today for follow-up.    Diagnoses and all orders for this visit:    Primary osteoarthritis of left hip        1. Primary osteoarthritis of left hip        I reviewed my findings with the patient today.  His hip responded " well to the intra-articular injection, and he desires continued conservative treatment.  I will see him back in 6 months, but sooner for any problems.  Excellent relief with the intra-articular hip injection.    Return in about 6 months (around 11/13/2019).        Melvin Kaba MD  05/13/19  6:03 PM

## 2019-11-11 ENCOUNTER — OFFICE VISIT (OUTPATIENT)
Dept: ORTHOPEDIC SURGERY | Facility: CLINIC | Age: 84
End: 2019-11-11

## 2019-11-11 VITALS — OXYGEN SATURATION: 99 % | BODY MASS INDEX: 25.11 KG/M2 | WEIGHT: 160 LBS | HEIGHT: 67 IN | HEART RATE: 121 BPM

## 2019-11-11 DIAGNOSIS — M16.12 PRIMARY OSTEOARTHRITIS OF LEFT HIP: Primary | ICD-10-CM

## 2019-11-11 PROCEDURE — 99212 OFFICE O/P EST SF 10 MIN: CPT | Performed by: ORTHOPAEDIC SURGERY

## 2019-11-11 NOTE — PROGRESS NOTES
[facility] [place] [facility]    Subjective     Chief Complaint   Patient presents with   • Follow-up     6 month follow up; 7 month s/p Primary osteoarthritis of left hip-Left hip intra-articular injections 4/12/19          HPI    Eric Myrick is a 84 y.o. male.  He follows up today for his left hip.  He is having no pain in the hip, and the previous injection provided relief.  No complaints today.      Patient Active Problem List   Diagnosis   • History of cardioembolic CVA   • History of loop recorder   • Coronary artery disease involving native coronary artery of native heart without angina pectoris   • Hyperlipidemia LDL goal <100   • GERD (gastroesophageal reflux disease)   • Obstructive sleep apnea on CPAP   • Paroxysmal atrial fibrillation (CMS/HCC)     Past Medical History:   Diagnosis Date   • Acute CVA (cerebrovascular accident) (CMS/HCC) 11/20/2014   • Atrial fibrillation (CMS/HCC)    • Former tobacco use    • GERD (gastroesophageal reflux disease)    • H/O acute myocardial infarction 12/17/1992   • History of loop recorder 11/30/2014    TWO EPISODES OF BRIEF ATRIAL FIBRILLATION   • Hyperlipidemia    • Obstructive sleep apnea on CPAP       Past Surgical History:   Procedure Laterality Date   • CORONARY ANGIOPLASTY Left 12/17/1992    ANGIOPLASTY ONLY; DATA DEFICIT   • HERNIA REPAIR Bilateral    • THROMBECTOMY        Family History   Problem Relation Age of Onset   • Cancer Mother    • No Known Problems Father      Social History     Socioeconomic History   • Marital status:      Spouse name: Not on file   • Number of children: Not on file   • Years of education: Not on file   • Highest education level: Not on file   Tobacco Use   • Smoking status: Former Smoker     Types: Cigarettes   • Smokeless tobacco: Never Used   • Tobacco comment: quit 45 years ago   Substance and Sexual Activity   • Alcohol use: No   • Drug use: No   • Sexual activity: Defer      Current Outpatient  "Medications on File Prior to Visit   Medication Sig Dispense Refill   • apixaban (ELIQUIS) 5 MG tablet tablet Take 1 tablet by mouth Every 12 (Twelve) Hours. 180 tablet 3   • atorvastatin (LIPITOR) 40 MG tablet Take 1 tablet by mouth Daily.     • B Complex Vitamins (VITAMIN B COMPLEX PO) Take  by mouth Daily.     • Multiple Vitamins-Minerals (MULTIVITAMIN ADULT PO) Take  by mouth Daily.     • omeprazole (PriLOSEC) 40 MG capsule Every Night.       No current facility-administered medications on file prior to visit.       No Known Allergies     Review of Systems   Constitutional: Negative.    HENT: Negative.    Eyes: Negative.    Respiratory: Negative.    Cardiovascular: Negative.    Gastrointestinal: Negative.    Endocrine: Negative.    Genitourinary: Negative.    Musculoskeletal: Positive for arthralgias.   Skin: Negative.    Allergic/Immunologic: Negative.    Neurological: Negative.    Hematological: Negative.    Psychiatric/Behavioral: Negative.         Objective      Physical Exam  Pulse (!) 121   Ht 170.2 cm (67.01\")   Wt 72.6 kg (160 lb)   SpO2 99%   BMI 25.05 kg/m²     Body mass index is 25.05 kg/m².    General:   Mental Status:  Alert   Appearance: Cooperative, in no acute distress   Build and Nutrition: Deconditioned male   Orientation: Alert and oriented to person, place and time   Posture: Normal   Gait: With a cane    Lower Extremity:              Left Hip:                          Tenderness:    None                          Range of motion:        External Rotation:       20°                                                              Internal Rotation:        20°                                                              Flexion:                       90°                                                              Extension:                   0°                       Deformities:     None  Functional testing: Negative [facility]      Assessment and Plan     Eric was seen today for " follow-up.    Diagnoses and all orders for this visit:    Primary osteoarthritis of left hip        1. Primary osteoarthritis of left hip        I reviewed my findings with the patient today.  His left hip is doing well, with no pain.  I will see him back if there are any problems in the future.    Return if symptoms worsen or fail to improve.          Melvin Kaba MD  11/11/19  12:20 PM

## 2019-12-28 ENCOUNTER — APPOINTMENT (OUTPATIENT)
Dept: GENERAL RADIOLOGY | Facility: HOSPITAL | Age: 84
End: 2019-12-28

## 2019-12-28 ENCOUNTER — HOSPITAL ENCOUNTER (EMERGENCY)
Facility: HOSPITAL | Age: 84
Discharge: HOME | End: 2019-12-28
Attending: EMERGENCY MEDICINE | Admitting: EMERGENCY MEDICINE

## 2019-12-28 VITALS
SYSTOLIC BLOOD PRESSURE: 113 MMHG | DIASTOLIC BLOOD PRESSURE: 95 MMHG | BODY MASS INDEX: 25.11 KG/M2 | RESPIRATION RATE: 16 BRPM | HEART RATE: 93 BPM | OXYGEN SATURATION: 92 % | TEMPERATURE: 98.2 F | WEIGHT: 160 LBS | HEIGHT: 67 IN

## 2019-12-28 DIAGNOSIS — K59.00 CONSTIPATION, UNSPECIFIED CONSTIPATION TYPE: Primary | ICD-10-CM

## 2019-12-28 DIAGNOSIS — K56.41 FECAL IMPACTION (HCC): ICD-10-CM

## 2019-12-28 PROCEDURE — 99284 EMERGENCY DEPT VISIT MOD MDM: CPT

## 2019-12-28 PROCEDURE — 74022 RADEX COMPL AQT ABD SERIES: CPT

## 2019-12-28 RX ORDER — POLYETHYLENE GLYCOL 3350 17 G/17G
17 POWDER, FOR SOLUTION ORAL DAILY
Qty: 225 G | Refills: 0 | Status: SHIPPED | OUTPATIENT
Start: 2019-12-28 | End: 2020-12-08

## 2019-12-28 RX ORDER — MAGNESIUM CARB/ALUMINUM HYDROX 105-160MG
296 TABLET,CHEWABLE ORAL ONCE
Status: COMPLETED | OUTPATIENT
Start: 2019-12-28 | End: 2019-12-28

## 2019-12-28 RX ADMIN — Medication 296 ML: at 14:15

## 2020-03-10 ENCOUNTER — OFFICE VISIT (OUTPATIENT)
Dept: CARDIOLOGY | Facility: CLINIC | Age: 85
End: 2020-03-10

## 2020-03-10 VITALS
BODY MASS INDEX: 25.74 KG/M2 | HEART RATE: 100 BPM | HEIGHT: 67 IN | WEIGHT: 164 LBS | SYSTOLIC BLOOD PRESSURE: 118 MMHG | DIASTOLIC BLOOD PRESSURE: 70 MMHG | OXYGEN SATURATION: 95 %

## 2020-03-10 DIAGNOSIS — I63.9 ACUTE CVA (CEREBROVASCULAR ACCIDENT) (HCC): ICD-10-CM

## 2020-03-10 DIAGNOSIS — E78.5 HYPERLIPIDEMIA LDL GOAL <100: ICD-10-CM

## 2020-03-10 DIAGNOSIS — I48.0 PAROXYSMAL ATRIAL FIBRILLATION (HCC): ICD-10-CM

## 2020-03-10 DIAGNOSIS — I25.10 CORONARY ARTERY DISEASE INVOLVING NATIVE CORONARY ARTERY OF NATIVE HEART WITHOUT ANGINA PECTORIS: Primary | ICD-10-CM

## 2020-03-10 DIAGNOSIS — R01.1 CARDIAC MURMUR: ICD-10-CM

## 2020-03-10 DIAGNOSIS — Z98.890 HISTORY OF LOOP RECORDER: ICD-10-CM

## 2020-03-10 PROCEDURE — 99214 OFFICE O/P EST MOD 30 MIN: CPT | Performed by: NURSE PRACTITIONER

## 2020-03-10 PROCEDURE — 93000 ELECTROCARDIOGRAM COMPLETE: CPT | Performed by: NURSE PRACTITIONER

## 2020-03-10 NOTE — ASSESSMENT & PLAN NOTE
· No signs or symptoms of TIA or stroke  · Maintaining normal sinus rhythm  · Increase Eliquis to therapeutic dosing of 5 mg twice daily

## 2020-03-10 NOTE — PROGRESS NOTES
[facility]  Office Visit Note    Encounter Date:03/10/2020    Patient ID: Eric Myrick is a 84 y.o. male who     CC/Reason for visit:    • Coronary artery disease  • Atrial fibrillation         Problem List Items Addressed This Visit        Cardiovascular and Mediastinum    Coronary artery disease involving native coronary artery of native heart without angina pectoris - Primary    Overview     · Cardiac catheterization for acute MI, [place], 1992: Angioplasty only.          Current Assessment & Plan     · No signs or symptoms of angina  · Defer asa d/t chronic anticoagulation with Eliquis         Paroxysmal atrial fibrillation (CMS/HCC)    Overview     · Confirmed on loop recorder, 2015  · Chads VASC = 5 (age > 75, CVA, CAD)         Current Assessment & Plan     · No signs or symptoms of TIA or stroke  · Maintaining normal sinus rhythm  · Increase Eliquis to therapeutic dosing of 5 mg twice daily         History of cardioembolic CVA    Overview     · MCA distribution CVA, 11/2014  · Loop recorder placement, 11 2014  · Confirmation of atrial fibrillation on loop recorder interrogation 2015 with initiation of Eliquis         Hyperlipidemia LDL goal <100    Overview     · Recommend moderate intensity statin therapy for stroke prevention         Current Assessment & Plan     · Continue Lipitor 40 mg daily  ·          Cardiac murmur    Current Assessment & Plan     · Obtain echocardiogram         Relevant Orders    Adult Transthoracic Echo Complete W/ Cont if Necessary Per Protocol       Other    History of loop recorder    Overview     · 11/30/2014: 2 episodes of brief atrial fibrillation         Current Assessment & Plan     · Loop recorder battery longevity ended years ago.             The patient has no signs or symptoms of angina, heart failure, TIA or CVA.  He is actually doing very well on minimal medications of Lipitor and Eliquis.  He needs to increase his Eliquis  "is twice daily so it is therapeutic dosing.  EKG today shows he is maintaining normal sinus rhythm.  On physical exam I did hear a audible murmur and/or cardiac rub.  I will obtain a echocardiogram for further evaluation.  Patient will follow-up in 6 months or sooner if needed.       · Obtain echocardiogram for murmur and/or cardiac rub  · Increase Eliquis to therapeutic dosing of 5 mg twice daily  Return in about 6 months (around 9/10/2020), or if symptoms worsen or fail to improve, for Follow-up with Dr. Ragsdale next visit.              Eric Myrick returns today for follow-up of his coronary artery disease, atrial fibrillation and cardiac risk factors.  The patient had a 6-month appointment in October but missed his appointment.  He actually forgot about scheduling until recently.  He is actually been feeling well without any chest pain or dyspnea.  He did have a issue with a fecal impaction due to taking chronic tramadol some months back.  He is also been having issues with his shoulders bilaterally.  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  He has been receiving cortisone shots but does not think it is really helping.  He has a history of a stroke and received a loop recorder implant back in 2014 which ended up diagnosing him with atrial fibrillation.  He also has known coronary disease having an MI in 1992 in which she received angioplasty but no stent placement.  He was initially on Eliquis and aspirin but was having issues with bleeding so aspirin was discontinued.  For reasons that remain unclear he is only been taking Eliquis once a day.  He denies any signs or symptoms of TIA or stroke.  He has been maintaining normal sinus rhythm.  His blood pressures have stayed controlled.  Review of Systems   Constitution: Negative for malaise/fatigue.   Eyes: Negative for vision loss in left eye and vision loss in right eye.   Cardiovascular: Negative for chest pain, dyspnea on exertion, near-syncope, " orthopnea, palpitations, paroxysmal nocturnal dyspnea and syncope.   Musculoskeletal: Negative for myalgias.   Neurological: Negative for brief paralysis, excessive daytime sleepiness, focal weakness, numbness, paresthesias and weakness.   All other systems reviewed and are negative.      The patient's past medical, social, family history and ROS reviewed in the patient's electronic medical record.    No Known Allergies      Current Outpatient Medications:   •  apixaban (ELIQUIS) 5 MG tablet tablet, Take 1 tablet by mouth Every 12 (Twelve) Hours. (Patient taking differently: Take 5 mg by mouth 1 (One) Time.), Disp: 180 tablet, Rfl: 3  •  atorvastatin (LIPITOR) 40 MG tablet, Take 1 tablet by mouth Daily., Disp: , Rfl:   •  B Complex Vitamins (VITAMIN B COMPLEX PO), Take  by mouth Daily., Disp: , Rfl:   •  Multiple Vitamins-Minerals (MULTIVITAMIN ADULT PO), Take  by mouth Daily., Disp: , Rfl:   •  omeprazole (PriLOSEC) 40 MG capsule, Every Night., Disp: , Rfl:   •  polyethylene glycol (MIRALAX) packet, Take 17 g by mouth Daily. Mix with non-carbonated beverage, Disp: 225 g, Rfl: 0    Past Medical History:   Diagnosis Date   • Acute CVA (cerebrovascular accident) (CMS/HCC) 11/20/2014   • Atrial fibrillation (CMS/HCC)    • Former tobacco use    • GERD (gastroesophageal reflux disease)    • H/O acute myocardial infarction 12/17/1992   • History of loop recorder 11/30/2014    TWO EPISODES OF BRIEF ATRIAL FIBRILLATION   • Hyperlipidemia    • Obstructive sleep apnea on CPAP        Past Surgical History:   Procedure Laterality Date   • CORONARY ANGIOPLASTY Left 12/17/1992    ANGIOPLASTY ONLY; DATA DEFICIT   • HERNIA REPAIR Bilateral    • THROMBECTOMY         Family History   Problem Relation Age of Onset   • Cancer Mother    • No Known Problems Father        Social History     Tobacco Use   • Smoking status: Former Smoker     Types: Cigarettes   • Smokeless tobacco: Never Used   • Tobacco comment: quit 45 years ago  "  Substance Use Topics   • Alcohol use: No           Blood pressure 118/70, pulse 100, height 170.2 cm (67\"), weight 74.4 kg (164 lb), SpO2 95 %.  Body mass index is 25.69 kg/m².  Vitals:    03/10/20 1132   Patient Position: Sitting       Physical Exam   Constitutional: He is oriented to person, place, and time. He appears well-developed and well-nourished.   HENT:   Head: Normocephalic and atraumatic.   Eyes: Conjunctivae are normal. No scleral icterus.   Neck: Normal range of motion. No JVD present. Carotid bruit is not present. No thyromegaly present.   Cardiovascular: Normal rate and regular rhythm. Exam reveals no gallop.   No murmur heard.  Pulmonary/Chest: Effort normal and breath sounds normal.   Abdominal: Soft. He exhibits no distension and no mass. There is no hepatosplenomegaly.   Musculoskeletal: He exhibits no edema.   Neurological: He is alert and oriented to person, place, and time.   Skin: Skin is warm and dry. No rash noted.   Psychiatric: He has a normal mood and affect. His behavior is normal.       Data Review (reviewed with patient):       ECG 12 Lead  Date/Time: 3/10/2020 12:57 PM  Performed by: Katharine Nava APRN  Authorized by: Katharine Nava APRN   Comparison: compared with previous ECG from 7/29/2018  Similar to previous ECG  Comparison to previous ECG: No changes from prior EKG  Rhythm: sinus rhythm  BPM: 91    Clinical impression: abnormal EKG  Comments: Normal sinus rhythm, possible left atrial enlargement, LVH  QT/QTc 364/447 MS            Lab Results   Component Value Date    TRIG 95 11/21/2014    HDL 31 (L) 11/21/2014    LDL 62 11/21/2014    AST 15 07/29/2018    ALT 10 07/29/2018       Lab Results   Component Value Date    HGBA1C 5.6 11/21/2014     Addendum labs obtained from PCP and resulted on 1/6/2020  BUN 13, creatinine 0.73, sodium 140, potassium 4.0, AST 20, ALT 17, HDL 54, triglycerides 75, total cholesterol 133, LDL 64, WBC 6.8, hemoglobin 15.2, hematocrit 43.3, " platelets 78      Katharine Nava, APRN    3/10/2020

## 2020-03-17 ENCOUNTER — HOSPITAL ENCOUNTER (OUTPATIENT)
Dept: CARDIOLOGY | Facility: HOSPITAL | Age: 85
Discharge: HOME | End: 2020-03-17
Admitting: NURSE PRACTITIONER

## 2020-03-17 VITALS — HEIGHT: 67 IN | BODY MASS INDEX: 25.74 KG/M2 | WEIGHT: 164.02 LBS

## 2020-03-17 DIAGNOSIS — R01.1 CARDIAC MURMUR: ICD-10-CM

## 2020-03-17 PROCEDURE — 93306 TTE W/DOPPLER COMPLETE: CPT | Performed by: INTERNAL MEDICINE

## 2020-03-17 PROCEDURE — 93306 TTE W/DOPPLER COMPLETE: CPT

## 2020-03-18 LAB
BH CV ECHO MEAS - AI DEC SLOPE: 160.5 CM/SEC^2
BH CV ECHO MEAS - AI MAX PG: 68.6 MMHG
BH CV ECHO MEAS - AI MAX VEL: 413.9 CM/SEC
BH CV ECHO MEAS - AI P1/2T: 755.3 MSEC
BH CV ECHO MEAS - AO MAX PG (FULL): 3.3 MMHG
BH CV ECHO MEAS - AO MAX PG: 6.5 MMHG
BH CV ECHO MEAS - AO MEAN PG (FULL): 2.2 MMHG
BH CV ECHO MEAS - AO MEAN PG: 3.6 MMHG
BH CV ECHO MEAS - AO ROOT AREA (BSA CORRECTED): 1.7
BH CV ECHO MEAS - AO ROOT AREA: 8.2 CM^2
BH CV ECHO MEAS - AO ROOT DIAM: 3.2 CM
BH CV ECHO MEAS - AO V2 MAX: 127.6 CM/SEC
BH CV ECHO MEAS - AO V2 MEAN: 89.7 CM/SEC
BH CV ECHO MEAS - AO V2 VTI: 32.5 CM
BH CV ECHO MEAS - AVA(I,A): 2.6 CM^2
BH CV ECHO MEAS - AVA(I,D): 2.6 CM^2
BH CV ECHO MEAS - AVA(V,A): 2.8 CM^2
BH CV ECHO MEAS - AVA(V,D): 2.8 CM^2
BH CV ECHO MEAS - BSA(HAYCOCK): 1.9 M^2
BH CV ECHO MEAS - BSA: 1.9 M^2
BH CV ECHO MEAS - BZI_BMI: 25.7 KILOGRAMS/M^2
BH CV ECHO MEAS - BZI_METRIC_HEIGHT: 170.2 CM
BH CV ECHO MEAS - BZI_METRIC_WEIGHT: 74.4 KG
BH CV ECHO MEAS - EDV(CUBED): 107.1 ML
BH CV ECHO MEAS - EDV(MOD-SP2): 99 ML
BH CV ECHO MEAS - EDV(MOD-SP4): 117 ML
BH CV ECHO MEAS - EDV(TEICH): 104.9 ML
BH CV ECHO MEAS - EF(CUBED): 67.3 %
BH CV ECHO MEAS - EF(MOD-BP): 61 %
BH CV ECHO MEAS - EF(MOD-SP2): 63.6 %
BH CV ECHO MEAS - EF(MOD-SP4): 57.3 %
BH CV ECHO MEAS - EF(TEICH): 58.8 %
BH CV ECHO MEAS - ESV(CUBED): 35 ML
BH CV ECHO MEAS - ESV(MOD-SP2): 36 ML
BH CV ECHO MEAS - ESV(MOD-SP4): 50 ML
BH CV ECHO MEAS - ESV(TEICH): 43.2 ML
BH CV ECHO MEAS - FS: 31.1 %
BH CV ECHO MEAS - IVS/LVPW: 1.2
BH CV ECHO MEAS - IVSD: 1.3 CM
BH CV ECHO MEAS - LA DIMENSION: 4.5 CM
BH CV ECHO MEAS - LA/AO: 1.4
BH CV ECHO MEAS - LAD MAJOR: 5.1 CM
BH CV ECHO MEAS - LAT PEAK E' VEL: 4.9 CM/SEC
BH CV ECHO MEAS - LATERAL E/E' RATIO: 20.6
BH CV ECHO MEAS - LV DIASTOLIC VOL/BSA (35-75): 63 ML/M^2
BH CV ECHO MEAS - LV MASS(C)D: 215.2 GRAMS
BH CV ECHO MEAS - LV MASS(C)DI: 115.8 GRAMS/M^2
BH CV ECHO MEAS - LV MAX PG: 3.2 MMHG
BH CV ECHO MEAS - LV MEAN PG: 1.4 MMHG
BH CV ECHO MEAS - LV SYSTOLIC VOL/BSA (12-30): 26.9 ML/M^2
BH CV ECHO MEAS - LV V1 MAX: 89.1 CM/SEC
BH CV ECHO MEAS - LV V1 MEAN: 52.6 CM/SEC
BH CV ECHO MEAS - LV V1 VTI: 20.6 CM
BH CV ECHO MEAS - LVIDD: 4.7 CM
BH CV ECHO MEAS - LVIDS: 3.3 CM
BH CV ECHO MEAS - LVLD AP2: 7.1 CM
BH CV ECHO MEAS - LVLD AP4: 6.9 CM
BH CV ECHO MEAS - LVLS AP2: 5.8 CM
BH CV ECHO MEAS - LVLS AP4: 6 CM
BH CV ECHO MEAS - LVOT AREA (M): 4.2 CM^2
BH CV ECHO MEAS - LVOT AREA: 4.1 CM^2
BH CV ECHO MEAS - LVOT DIAM: 2.3 CM
BH CV ECHO MEAS - LVPWD: 1.1 CM
BH CV ECHO MEAS - MED PEAK E' VEL: 3 CM/SEC
BH CV ECHO MEAS - MEDIAL E/E' RATIO: 33.9
BH CV ECHO MEAS - MV A MAX VEL: 143.1 CM/SEC
BH CV ECHO MEAS - MV DEC SLOPE: 369.1 CM/SEC^2
BH CV ECHO MEAS - MV DEC TIME: 0.29 SEC
BH CV ECHO MEAS - MV E MAX VEL: 104.3 CM/SEC
BH CV ECHO MEAS - MV E/A: 0.73
BH CV ECHO MEAS - MV MAX PG: 9.3 MMHG
BH CV ECHO MEAS - MV MEAN PG: 2.8 MMHG
BH CV ECHO MEAS - MV P1/2T MAX VEL: 89.3 CM/SEC
BH CV ECHO MEAS - MV P1/2T: 70.9 MSEC
BH CV ECHO MEAS - MV V2 MAX: 152.6 CM/SEC
BH CV ECHO MEAS - MV V2 MEAN: 73.7 CM/SEC
BH CV ECHO MEAS - MV V2 VTI: 41.3 CM
BH CV ECHO MEAS - MVA P1/2T LCG: 2.5 CM^2
BH CV ECHO MEAS - MVA(P1/2T): 3.1 CM^2
BH CV ECHO MEAS - MVA(VTI): 2 CM^2
BH CV ECHO MEAS - PA ACC SLOPE: 937.4 CM/SEC^2
BH CV ECHO MEAS - PA ACC TIME: 0.12 SEC
BH CV ECHO MEAS - PA MAX PG: 4.7 MMHG
BH CV ECHO MEAS - PA PR(ACCEL): 25.9 MMHG
BH CV ECHO MEAS - PA V2 MAX: 107.7 CM/SEC
BH CV ECHO MEAS - PI END-D VEL: 87.9 CM/SEC
BH CV ECHO MEAS - RAP SYSTOLE: 8 MMHG
BH CV ECHO MEAS - RVSP: 28 MMHG
BH CV ECHO MEAS - SI(AO): 144 ML/M^2
BH CV ECHO MEAS - SI(CUBED): 38.8 ML/M^2
BH CV ECHO MEAS - SI(LVOT): 44.9 ML/M^2
BH CV ECHO MEAS - SI(MOD-SP2): 33.9 ML/M^2
BH CV ECHO MEAS - SI(MOD-SP4): 36.1 ML/M^2
BH CV ECHO MEAS - SI(TEICH): 33.2 ML/M^2
BH CV ECHO MEAS - SV(AO): 267.7 ML
BH CV ECHO MEAS - SV(CUBED): 72.1 ML
BH CV ECHO MEAS - SV(LVOT): 83.4 ML
BH CV ECHO MEAS - SV(MOD-SP2): 63 ML
BH CV ECHO MEAS - SV(MOD-SP4): 67 ML
BH CV ECHO MEAS - SV(TEICH): 61.6 ML
BH CV ECHO MEAS - TAPSE (>1.6): 1.9 CM2
BH CV ECHO MEAS - TR MAX PG: 20 MMHG
BH CV ECHO MEAS - TR MAX VEL: 222.6 CM/SEC
BH CV ECHO MEASUREMENTS AVERAGE E/E' RATIO: 26.41
BH CV VAS BP LEFT ARM: NORMAL MMHG
BH CV XLRA - RV BASE: 3.6 CM
BH CV XLRA - RV LENGTH: 6.3 CM
BH CV XLRA - RV MID: 2.1 CM
BH CV XLRA - TDI S': 11.4 CM/SEC
LEFT ATRIUM VOLUME INDEX: 40.4 ML/M^2
LEFT ATRIUM VOLUME: 75 ML
LV EF 2D ECHO EST: 60 %
MAXIMAL PREDICTED HEART RATE: 136 BPM
STRESS TARGET HR: 116 BPM

## 2020-03-19 ENCOUNTER — TELEPHONE (OUTPATIENT)
Dept: CARDIOLOGY | Facility: CLINIC | Age: 85
End: 2020-03-19

## 2020-03-19 NOTE — TELEPHONE ENCOUNTER
"Please call the patient and tell him that we would like to push his follow-up visit back due to issues with the coronavirus.  His echo shows normal heart function with some valve issues related to his age.  I do not think these valves will be a \"issue\".    MILADIS Ragsdale MD [facility], [facility]  Interventional and General Cardiology    "

## 2020-05-26 DIAGNOSIS — I48.0 PAROXYSMAL ATRIAL FIBRILLATION (HCC): ICD-10-CM

## 2020-05-28 DIAGNOSIS — I48.0 PAROXYSMAL ATRIAL FIBRILLATION (HCC): ICD-10-CM

## 2020-10-12 ENCOUNTER — OFFICE VISIT (OUTPATIENT)
Dept: ORTHOPEDIC SURGERY | Facility: CLINIC | Age: 85
End: 2020-10-12

## 2020-10-12 VITALS — OXYGEN SATURATION: 98 % | WEIGHT: 154.2 LBS | HEIGHT: 67 IN | HEART RATE: 79 BPM | BODY MASS INDEX: 24.2 KG/M2

## 2020-10-12 DIAGNOSIS — M16.12 PRIMARY OSTEOARTHRITIS OF LEFT HIP: Primary | ICD-10-CM

## 2020-10-12 PROCEDURE — 99213 OFFICE O/P EST LOW 20 MIN: CPT | Performed by: ORTHOPAEDIC SURGERY

## 2020-10-12 NOTE — PROGRESS NOTES
[facility] Note    Subjective     Chief Complaint   Patient presents with   • Left Hip - Pain, Follow-up     11 month f/u; intra-articular hip injection 4/12/2019        HPI    It has been 11  month(s) since Mr. Myrick's last visit. He returns to clinic today for follow-up of left hip arthritis. He rates his pain a 5/10 on the pain scale. Previous/current treatments: cane/walker and steroid injection (last injection 4/12/2019). Current symptoms: pain. The pain is worse with walking, standing and climbing stairs; cortisone injection improved the pain. Overall, he is doing better.  He is not interested in surgical intervention.  Previous injection in his hip provided relief.    I have reviewed the following portions of the patient's history and agree with: History of Present Illness and Review of Systems    Patient Active Problem List   Diagnosis   • History of cardioembolic CVA   • History of loop recorder   • Coronary artery disease involving native coronary artery of native heart without angina pectoris   • Hyperlipidemia LDL goal <100   • GERD (gastroesophageal reflux disease)   • Obstructive sleep apnea on CPAP   • Paroxysmal atrial fibrillation (CMS/HCC)   • Cardiac murmur     Past Medical History:   Diagnosis Date   • Acute CVA (cerebrovascular accident) (CMS/HCC) 11/20/2014   • Atrial fibrillation (CMS/HCC)    • Former tobacco use    • GERD (gastroesophageal reflux disease)    • H/O acute myocardial infarction 12/17/1992   • History of loop recorder 11/30/2014    TWO EPISODES OF BRIEF ATRIAL FIBRILLATION   • Hyperlipidemia    • Obstructive sleep apnea on CPAP       Past Surgical History:   Procedure Laterality Date   • CORONARY ANGIOPLASTY Left 12/17/1992    ANGIOPLASTY ONLY; DATA DEFICIT   • HERNIA REPAIR Bilateral    • THROMBECTOMY        Family History   Problem Relation Age of Onset   • Cancer Mother    • No Known Problems Father      Social History     Socioeconomic History   •  Marital status:      Spouse name: Not on file   • Number of children: Not on file   • Years of education: Not on file   • Highest education level: Not on file   Tobacco Use   • Smoking status: Former Smoker     Types: Cigarettes   • Smokeless tobacco: Never Used   • Tobacco comment: quit 45 years ago   Substance and Sexual Activity   • Alcohol use: No   • Drug use: No   • Sexual activity: Defer      Current Outpatient Medications on File Prior to Visit   Medication Sig Dispense Refill   • apixaban (ELIQUIS) 5 MG tablet tablet Take 1 tablet by mouth Every 12 (Twelve) Hours. 180 tablet 3   • atorvastatin (LIPITOR) 40 MG tablet Take 1 tablet by mouth Daily.     • B Complex Vitamins (VITAMIN B COMPLEX PO) Take  by mouth Daily.     • Multiple Vitamins-Minerals (MULTIVITAMIN ADULT PO) Take  by mouth Daily.     • omeprazole (PriLOSEC) 40 MG capsule Every Night.     • polyethylene glycol (MIRALAX) packet Take 17 g by mouth Daily. Mix with non-carbonated beverage 225 g 0     No current facility-administered medications on file prior to visit.       No Known Allergies     Review of Systems   Constitutional: Negative for activity change, appetite change, chills, diaphoresis, fatigue, fever and unexpected weight change.   HENT: Negative for congestion, dental problem, drooling, ear discharge, ear pain, facial swelling, hearing loss, mouth sores, nosebleeds, postnasal drip, rhinorrhea, sinus pressure, sneezing, sore throat, tinnitus, trouble swallowing and voice change.    Eyes: Negative for photophobia, pain, discharge, redness, itching and visual disturbance.   Respiratory: Negative for apnea, cough, choking, chest tightness, shortness of breath, wheezing and stridor.    Cardiovascular: Negative for chest pain, palpitations and leg swelling.   Gastrointestinal: Negative for abdominal distention, abdominal pain, anal bleeding, blood in stool, constipation, diarrhea, nausea, rectal pain and vomiting.   Endocrine: Negative  "for cold intolerance, heat intolerance, polydipsia, polyphagia and polyuria.   Genitourinary: Negative for decreased urine volume, difficulty urinating, dysuria, enuresis, flank pain, frequency, genital sores, hematuria and urgency.   Musculoskeletal: Positive for arthralgias. Negative for back pain, gait problem, joint swelling, myalgias, neck pain and neck stiffness.   Skin: Negative for color change, pallor, rash and wound.   Allergic/Immunologic: Negative for environmental allergies, food allergies and immunocompromised state.   Neurological: Negative for dizziness, tremors, seizures, syncope, facial asymmetry, speech difficulty, weakness, light-headedness, numbness and headaches.   Hematological: Negative for adenopathy. Does not bruise/bleed easily.   Psychiatric/Behavioral: Negative for agitation, behavioral problems, confusion, decreased concentration, dysphoric mood, hallucinations, self-injury, sleep disturbance and suicidal ideas. The patient is not nervous/anxious and is not hyperactive.         Objective      Physical Exam  Pulse 79   Ht 170.2 cm (67.01\")   Wt 69.9 kg (154 lb 3.2 oz)   SpO2 98%   BMI 24.15 kg/m²     Body mass index is 24.15 kg/m².    General:   Mental Status:  Alert   Appearance: Cooperative, in no acute distress   Build and Nutrition: Deconditioned male   Orientation: Alert and oriented to person, place and time   Posture: Normal   Gait: With a cane    Lower Extremity:              Left Hip:                          Tenderness:    None                          Range of motion:        External Rotation:       20°                                                              Internal Rotation:        20°                                                              Flexion:                       90°                                                              Extension:                   0°                       Deformities:     None  Functional testing: Negative " [facility]    Imaging/Studies  Imaging Results (Last 24 Hours)     Procedure Component Value Units Date/Time    XR Hip With or Without Pelvis 2 - 3 View Left [[id number]] Resulted: 10/12/20 1151     Updated: 10/12/20 1152    Narrative:      Left Hip Radiographs  Indication: left hip pain  Views: low AP pelvis and lateral of the left hip    Comparison: 3/25/2019    Findings:   Advanced arthritic changes, mild worsening compared to previous films,   with bone-on-bone contact, osteophytes at the head neck junction, with no   unusual bony features.  Cystic formation of the femoral head and   acetabulum.            Assessment and Plan     Eric was seen today for pain and follow-up.    Diagnoses and all orders for this visit:    Primary osteoarthritis of left hip  -     XR Hip With or Without Pelvis 2 - 3 View Left  -     External Facility Surgical/Procedural Request; Future        1. Primary osteoarthritis of left hip        I reviewed my findings with patient today.  Left hip is bothering him to the point where he did like to have another injection.  He is not interested in surgical intervention.  We will schedule an intra-articular hip injection under fluoroscopic guidance at a mutually convenient time in the near future.    Return in about 4 weeks (around 11/9/2020) for After Hip Injection.    Medical Decision Making  Management Options : prescription/IM medicine  Data/Risk: radiology tests and independent visualization of imaging, lab tests, or EMG/NCV      Melvin Kaba MD  10/12/20  12:02 EDT    Dragon disclaimer:  Much of this encounter note is an electronic transcription/translation of spoken language to printed text. The electronic translation of spoken language may permit erroneous, or at times, nonsensical words or phrases to be inadvertently transcribed; Although I have reviewed the note for such errors, some may still exist.

## 2020-10-20 ENCOUNTER — APPOINTMENT (OUTPATIENT)
Dept: PREADMISSION TESTING | Facility: HOSPITAL | Age: 85
End: 2020-10-20

## 2020-12-03 PROBLEM — I38 VALVULAR HEART DISEASE: Status: ACTIVE | Noted: 2020-03-10

## 2020-12-03 NOTE — PROGRESS NOTES
[facility]  Office Visit Note    DATE: 12/08/2020    IDENTIFICATION: Eric Myrick is a 85 y.o. male who resides in [place], [place]    REASON FOR VISIT:  • Coronary artery disease  • Hyperlipidemia  • Atrial fibrillation            Eric Myrick returns today for follow-up of his coronary artery disease, atrial fibrillation and cardiac risk factors.  At his last visit an echocardiogram was ordered due to an audible murmur on physical exam.  Results showed normal LVEF and mild to moderate aortic insufficiency with calcification of the aortic valve. There was calcification and restricted mobility of the posterior leaflet of the mitral valve with moderate MR. The patient has been feeling well.  He has known atrial fibrillation that was detected on loop recorder implant laced back in 2014 after suffering a cardioembolic CVA.  He has been anticoagulated with Eliquis ever since then.  His loop recorder is no longer functioning as the battery is depleted.  He denies signs or symptoms of TIA or CVA.  He denies signs or symptoms of active or overt bleeding.  Patient denies chest pain, dyspnea, orthopnea, palpitations or syncope.  Overall he feels well.  His only complaint is of left hip pain for which she is seeing orthopedics.  They are planning a epidural for his discomfort and he was tested for COVID-19 earlier today in preparation for the procedure.    Review of Systems   Constitution: Negative for malaise/fatigue.   Eyes: Negative for vision loss in left eye and vision loss in right eye.   Cardiovascular: Negative for chest pain, dyspnea on exertion, near-syncope, orthopnea, palpitations, paroxysmal nocturnal dyspnea and syncope.   Musculoskeletal: Negative for myalgias.   Neurological: Negative for brief paralysis, excessive daytime sleepiness, focal weakness, numbness, paresthesias and weakness.   All other systems reviewed and are negative.      The patient's past  "medical, social, family history and ROS reviewed in the patient's electronic medical record.    No Known Allergies      Current Outpatient Medications:   •  apixaban (ELIQUIS) 5 MG tablet tablet, Take 1 tablet by mouth Every 12 (Twelve) Hours., Disp: 180 tablet, Rfl: 3  •  atorvastatin (LIPITOR) 40 MG tablet, Take 1 tablet by mouth Daily., Disp: , Rfl:   •  B Complex Vitamins (VITAMIN B COMPLEX PO), Take  by mouth Daily., Disp: , Rfl:   •  Multiple Vitamins-Minerals (MULTIVITAMIN ADULT PO), Take  by mouth Daily., Disp: , Rfl:   •  omeprazole (PriLOSEC) 40 MG capsule, Every Night., Disp: , Rfl:   •  psyllium (METAMUCIL) 58.6 % packet, Take 1 packet by mouth Daily., Disp: , Rfl:     Past Medical History:   Diagnosis Date   • Acute CVA (cerebrovascular accident) (CMS/HCC) 11/20/2014   • Atrial fibrillation (CMS/[facility])    • Former tobacco use    • GERD (gastroesophageal reflux disease)    • H/O acute myocardial infarction 12/17/1992   • History of loop recorder 11/30/2014    TWO EPISODES OF BRIEF ATRIAL FIBRILLATION   • Hyperlipidemia    • Obstructive sleep apnea on CPAP        Past Surgical History:   Procedure Laterality Date   • CORONARY ANGIOPLASTY Left 12/17/1992    ANGIOPLASTY ONLY; DATA DEFICIT   • HERNIA REPAIR Bilateral    • THROMBECTOMY         Family History   Problem Relation Age of Onset   • Cancer Mother    • No Known Problems Father        Social History     Tobacco Use   • Smoking status: Former Smoker     Types: Cigarettes   • Smokeless tobacco: Never Used   • Tobacco comment: quit 45 years ago   Substance Use Topics   • Alcohol use: No           Blood pressure 140/78, pulse 76, height 170.2 cm (67\"), weight 72.3 kg (159 lb 6.4 oz), SpO2 94 %.  Body mass index is 24.97 kg/m².  Vitals:    12/08/20 1437   Patient Position: Sitting       Constitutional:       Appearance: Healthy appearance. Well-developed.   Eyes:      General: Lids are normal. No scleral icterus.     Conjunctiva/sclera: Conjunctivae normal. "   HENT:      Head: Normocephalic and atraumatic.   Neck:      Musculoskeletal: Normal range of motion.      Thyroid: No thyromegaly.      Vascular: No carotid bruit or JVD.   Pulmonary:      Effort: Pulmonary effort is normal.      Breath sounds: Normal breath sounds. No wheezing. No rhonchi. No rales.   Cardiovascular:      Normal rate. Regular rhythm.      Murmurs: There is a high frequency blowing holosystolic murmur at the apex.      No gallop. No rub.   Pulses:     Intact distal pulses.   Edema:     Peripheral edema absent.   Abdominal:      General: There is no distension.      Palpations: Abdomen is soft. There is no abdominal mass.   Skin:     General: Skin is warm and dry.      Findings: No rash.   Neurological:      General: No focal deficit present.      Mental Status: Alert and oriented to person, place, and time.      Gait: Gait is intact.   Psychiatric:         Attention and Perception: Attention normal.         Mood and Affect: Mood normal.         Behavior: Behavior normal.         Data Review (reviewed with patient):     Procedures    Lab Results   Component Value Date    GLUCOSE 111 (H) 07/29/2018    BUN 16 07/29/2018    CREATININE 0.83 07/29/2018    EGFRIFNONA 89 07/29/2018    BCR 19.3 07/29/2018    K 4.1 07/29/2018    CO2 28.0 07/29/2018    CALCIUM 8.9 07/29/2018    ALBUMIN 3.99 07/29/2018    ALKPHOS 123 (H) 07/29/2018    AST 15 07/29/2018    ALT 10 07/29/2018      Lab Results   Component Value Date    CHLPL 113 11/21/2014    TRIG 95 11/21/2014    HDL 31 (L) 11/21/2014    LDL 62 11/21/2014     Lab Results   Component Value Date    HGBA1C 5.6 11/21/2014     Lab Results   Component Value Date    WBC 8.10 07/29/2018    HGB 14.6 07/29/2018    HCT 45.1 07/29/2018    MCV 94.5 07/29/2018     07/29/2018     Lab Results   Component Value Date    TSH 0.997 11/21/2014            Problem List Items Addressed This Visit        Cardiovascular and Mediastinum    Coronary artery disease involving native  coronary artery of native heart without angina pectoris - Primary    Overview     · Cardiac catheterization for acute MI, [place], 1992: Angioplasty only.          Current Assessment & Plan     · No signs or symptoms of angina  · Defer aspirin due to chronic anticoagulation with Eliquis         History of cardioembolic CVA    Overview     · MCA distribution CVA, 11/2014  · Loop recorder placement, 11 2014  · Confirmation of atrial fibrillation on loop recorder interrogation 2015 with initiation of Eliquis         Current Assessment & Plan     · Continue Eliquis 5 mg twice daily         Hyperlipidemia LDL goal <100    Overview     · Recommend moderate intensity statin therapy for stroke prevention         Current Assessment & Plan     · Continue Lipitor 40 mg daily         Paroxysmal atrial fibrillation (CMS/[facility])    Overview     · Confirmed on loop recorder, 2015  · Chads VASC = 5 (age > 75, CVA, CAD)         Current Assessment & Plan     · No signs or symptoms TIA or CVA  · Continue Eliquis 5 mg twice daily         Valvular heart disease    Overview     · Echo (3/18/2020): LVEF = 60%.  Mild to moderate AI.  Calcification and restricted mobility of posterior leaflet of mitral valve.  Moderate MR.  RVSP less than 35 mmHg.         Current Assessment & Plan     · Stable NYHA class II symptoms            Other    History of loop recorder    Overview     · 11/30/2014: 2 episodes of brief atrial fibrillation         Current Assessment & Plan     · Loop recorder end-of-life no device interrogation needed             The patient is doing well from a cardiovascular standpoint.  He has no signs or symptoms of angina or heart failure.  He is in normal sinus rhythm today.  Prior to his orthopedic procedure he should hold his Eliquis for 3 days and resume post procedure.  He is cleared from a cardiovascular standpoint to proceed.  He will follow-up 6 months or sooner if needed.       · Patient cleared for the pubic procedure  on left hip.  Will need to hold Eliquis 3 days prior and resume post procedure.  Return in about 6 months (around 6/8/2021), or if symptoms worsen or fail to improve, for Follow-up with Dr. Ragsdale next visit.      Katharine Nava, APRN  12/8/2020

## 2020-12-08 ENCOUNTER — OFFICE VISIT (OUTPATIENT)
Dept: CARDIOLOGY | Facility: CLINIC | Age: 85
End: 2020-12-08

## 2020-12-08 ENCOUNTER — APPOINTMENT (OUTPATIENT)
Dept: PREADMISSION TESTING | Facility: HOSPITAL | Age: 85
End: 2020-12-08

## 2020-12-08 VITALS
OXYGEN SATURATION: 94 % | HEART RATE: 76 BPM | SYSTOLIC BLOOD PRESSURE: 140 MMHG | HEIGHT: 67 IN | WEIGHT: 159.4 LBS | BODY MASS INDEX: 25.02 KG/M2 | DIASTOLIC BLOOD PRESSURE: 78 MMHG

## 2020-12-08 DIAGNOSIS — E78.5 HYPERLIPIDEMIA LDL GOAL <100: ICD-10-CM

## 2020-12-08 DIAGNOSIS — I63.9 ACUTE CVA (CEREBROVASCULAR ACCIDENT) (HCC): ICD-10-CM

## 2020-12-08 DIAGNOSIS — Z98.890 HISTORY OF LOOP RECORDER: ICD-10-CM

## 2020-12-08 DIAGNOSIS — I38 VALVULAR HEART DISEASE: ICD-10-CM

## 2020-12-08 DIAGNOSIS — I48.0 PAROXYSMAL ATRIAL FIBRILLATION (HCC): ICD-10-CM

## 2020-12-08 DIAGNOSIS — I25.10 CORONARY ARTERY DISEASE INVOLVING NATIVE CORONARY ARTERY OF NATIVE HEART WITHOUT ANGINA PECTORIS: Primary | ICD-10-CM

## 2020-12-08 PROCEDURE — U0004 COV-19 TEST NON-CDC HGH THRU: HCPCS

## 2020-12-08 PROCEDURE — 99214 OFFICE O/P EST MOD 30 MIN: CPT | Performed by: NURSE PRACTITIONER

## 2020-12-08 PROCEDURE — C9803 HOPD COVID-19 SPEC COLLECT: HCPCS

## 2020-12-09 LAB — SARS-COV-2 RNA RESP QL NAA+PROBE: NOT DETECTED

## 2020-12-10 ENCOUNTER — TELEPHONE (OUTPATIENT)
Dept: ORTHOPEDIC SURGERY | Facility: CLINIC | Age: 85
End: 2020-12-10

## 2020-12-10 NOTE — TELEPHONE ENCOUNTER
CALLED PATIENT TO GIVE ARRIVAL TIME OF 6:30AM FOR HIP INJECTION AT [facility]. ASKED TO CALL AND CONFIRM RECEIPT OF VOICE MAIL.

## 2020-12-11 ENCOUNTER — OUTSIDE FACILITY SERVICE (OUTPATIENT)
Dept: ORTHOPEDIC SURGERY | Facility: CLINIC | Age: 85
End: 2020-12-11

## 2020-12-11 PROCEDURE — 20610 DRAIN/INJ JOINT/BURSA W/O US: CPT | Performed by: ORTHOPAEDIC SURGERY

## 2020-12-11 PROCEDURE — 77002 NEEDLE LOCALIZATION BY XRAY: CPT | Performed by: ORTHOPAEDIC SURGERY

## 2021-09-28 ENCOUNTER — OFFICE VISIT (OUTPATIENT)
Dept: CARDIOLOGY | Facility: CLINIC | Age: 86
End: 2021-09-28

## 2021-09-28 VITALS
BODY MASS INDEX: 25.05 KG/M2 | WEIGHT: 159.6 LBS | DIASTOLIC BLOOD PRESSURE: 86 MMHG | HEIGHT: 67 IN | HEART RATE: 95 BPM | SYSTOLIC BLOOD PRESSURE: 140 MMHG | OXYGEN SATURATION: 95 %

## 2021-09-28 DIAGNOSIS — I25.10 CORONARY ARTERY DISEASE INVOLVING NATIVE CORONARY ARTERY OF NATIVE HEART WITHOUT ANGINA PECTORIS: Primary | Chronic | ICD-10-CM

## 2021-09-28 DIAGNOSIS — I38 VALVULAR HEART DISEASE: ICD-10-CM

## 2021-09-28 DIAGNOSIS — E78.5 HYPERLIPIDEMIA LDL GOAL <100: ICD-10-CM

## 2021-09-28 DIAGNOSIS — I48.0 PAROXYSMAL ATRIAL FIBRILLATION (HCC): ICD-10-CM

## 2021-09-28 PROCEDURE — 99214 OFFICE O/P EST MOD 30 MIN: CPT | Performed by: INTERNAL MEDICINE

## 2021-09-28 RX ORDER — KETOROLAC TROMETHAMINE 4 MG/ML
SOLUTION/ DROPS OPHTHALMIC
COMMUNITY

## 2021-09-28 RX ORDER — ATORVASTATIN CALCIUM 40 MG/1
40 TABLET, FILM COATED ORAL DAILY
Start: 2021-09-28

## 2021-09-28 NOTE — PROGRESS NOTES
"· [facility]      Identification: Eric Myrick is a 85 y.o. male who resides in [place], [place]    Reason for visit:  · Atrial fibrillation  · CAD      Subjective      Eric Myrick presents to [facility] for followup.    Mr. Myrick returns the office today in follow-up of his paroxysmal atrial fibrillation, CAD, and history of stroke. He denies any new neurologic symptoms. He walks with a walker ever since his stroke 7 years ago. He denies any angina or heart failure symptoms. He had blood work performed at his PCP office recently. These results are not available.            Review of Systems   Cardiovascular: Negative.    Respiratory: Negative.        Objective     /86 (BP Location: Left arm, Patient Position: Sitting)   Pulse 95   Ht 170.2 cm (67\")   Wt 72.4 kg (159 lb 9.6 oz)   SpO2 95%   BMI 25.00 kg/m²       Constitutional:       Appearance: Healthy appearance. Well-developed.   Eyes:      General: No scleral icterus.  HENT:      Head: Normocephalic and atraumatic.   Neck:      Vascular: No carotid bruit or JVD. JVD normal.   Pulmonary:      Effort: Pulmonary effort is normal.      Breath sounds: Normal breath sounds.   Cardiovascular:      Normal rate. Regular rhythm.      Murmurs: There is no murmur.      No gallop.   Musculoskeletal:      Extremities: No clubbing present.Skin:     General: Skin is warm and dry. There is no cyanosis.   Neurological:      Mental Status: Alert.   Psychiatric:         Attention and Perception: Attention normal.         Behavior: Behavior normal.         Result Review :    Lab date: 1/6/2020  • FLP: , TG 75, HDL 54, LDL 64        Assessment     Problem List Items Addressed This Visit        Cardiology Problems    Paroxysmal atrial fibrillation (CMS/HCC) (Chronic)    Overview     · Confirmed on loop recorder, 2015  · Chads VASC = 5 (age > 75, CVA, CAD)         Current Assessment & Plan     · Asymptomatic  · Continue " apixaban for stroke prophylaxis         Relevant Medications    apixaban (ELIQUIS) 5 MG tablet tablet    Coronary artery disease involving native coronary artery of native heart without angina pectoris - Primary (Chronic)    Overview     · Cardiac catheterization for acute MI, [place], 1992: Angioplasty only.          Current Assessment & Plan     · No angina         Hyperlipidemia LDL goal <100 (Chronic)    Overview     · Recommend moderate intensity statin therapy for stroke prevention         Current Assessment & Plan     · Continue atorvastatin         Relevant Medications    atorvastatin (LIPITOR) 40 MG tablet    Valvular heart disease (Chronic)    Overview     · Echo (3/18/2020): LVEF 60%.  Mild to moderate AI.  Moderate MR.  RVSP < 35 mmHg.         Current Assessment & Plan     · No heart failure symptoms  · Repeat echo in 2023               Plan   • Continue present medical therapy      Follow-up   Return in about 9 months (around 6/28/2022).        Duglas Ragsdale MD, FACC, [facility]  9/28/2021

## 2021-11-17 ENCOUNTER — OFFICE VISIT (OUTPATIENT)
Dept: ORTHOPEDIC SURGERY | Facility: CLINIC | Age: 86
End: 2021-11-17

## 2021-11-17 VITALS
HEART RATE: 96 BPM | BODY MASS INDEX: 24.17 KG/M2 | DIASTOLIC BLOOD PRESSURE: 81 MMHG | SYSTOLIC BLOOD PRESSURE: 196 MMHG | WEIGHT: 154 LBS | HEIGHT: 67 IN

## 2021-11-17 DIAGNOSIS — M16.12 PRIMARY OSTEOARTHRITIS OF LEFT HIP: Primary | ICD-10-CM

## 2021-11-17 PROCEDURE — 99213 OFFICE O/P EST LOW 20 MIN: CPT | Performed by: ORTHOPAEDIC SURGERY

## 2021-11-17 NOTE — PROGRESS NOTES
[facility] Note    Subjective     Chief Complaint   Patient presents with   • Follow-up     left Hip Pain (Primary osteoarthritis of left hip)         HPI    It has been 11  month(s) since Mr. yMrick's last visit. He returns to clinic today for follow-up of left hip arthritis and pain. The issue has been ongoing for 4 year(s) worsening x 3 months without injury. He rates his pain a 8/10 on the pain scale and is sharp at times. Previous/current treatments: Left hip injection under fluoroscopy 12/10/20. Current symptoms: pain. The pain is worse with rising from seated position especially in a lower seat; resting improve the pain. Overall, he is doing the same.  He is interested in a repeat injection.  He is not interested in surgical intervention.    I have reviewed the following portions of the patient's history and agree with: History of Present Illness and Review of Systems    Patient Active Problem List   Diagnosis   • History of cardioembolic CVA   • History of loop recorder   • Coronary artery disease involving native coronary artery of native heart without angina pectoris   • Hyperlipidemia LDL goal <100   • GERD (gastroesophageal reflux disease)   • Obstructive sleep apnea on CPAP   • Paroxysmal atrial fibrillation (HCC)   • Valvular heart disease     Past Medical History:   Diagnosis Date   • Acute CVA (cerebrovascular accident) (HCC) 11/20/2014   • Atrial fibrillation (HCC)    • Former tobacco use    • GERD (gastroesophageal reflux disease)    • H/O acute myocardial infarction 12/17/1992   • History of loop recorder 11/30/2014    TWO EPISODES OF BRIEF ATRIAL FIBRILLATION   • Hyperlipidemia    • Obstructive sleep apnea on CPAP       Past Surgical History:   Procedure Laterality Date   • CATARACT EXTRACTION     • CORONARY ANGIOPLASTY Left 12/17/1992    ANGIOPLASTY ONLY; DATA DEFICIT   • HERNIA REPAIR Bilateral    • THROMBECTOMY        Family History   Problem Relation Age of Onset  "  • Cancer Mother    • No Known Problems Father      Social History     Socioeconomic History   • Marital status:    Tobacco Use   • Smoking status: Former Smoker     Types: Cigarettes   • Smokeless tobacco: Never Used   • Tobacco comment: quit 45 years ago   Vaping Use   • Vaping Use: Never used   Substance and Sexual Activity   • Alcohol use: No   • Drug use: No   • Sexual activity: Defer      Current Outpatient Medications on File Prior to Visit   Medication Sig Dispense Refill   • apixaban (ELIQUIS) 5 MG tablet tablet Take 1 tablet by mouth Every 12 (Twelve) Hours. 180 tablet 3   • atorvastatin (LIPITOR) 40 MG tablet Take 1 tablet by mouth Daily.     • B Complex Vitamins (VITAMIN B COMPLEX PO) Take  by mouth Daily.     • ketorolac (ACULAR) 0.4 % solution ketorolac 0.4 % eye drops     • Multiple Vitamins-Minerals (MULTIVITAMIN ADULT PO) Take  by mouth Daily.     • omeprazole (PriLOSEC) 40 MG capsule Every Night.     • psyllium (METAMUCIL) 58.6 % packet Take 1 packet by mouth Daily.       No current facility-administered medications on file prior to visit.      No Known Allergies     Review of Systems   Constitutional: Negative.    HENT: Negative.    Eyes: Negative.    Respiratory: Negative.    Cardiovascular: Negative.    Gastrointestinal: Negative.    Endocrine: Negative.    Genitourinary: Negative.    Musculoskeletal: Positive for arthralgias.   Skin: Negative.    Allergic/Immunologic: Negative.    Neurological: Negative.    Hematological: Negative.    Psychiatric/Behavioral: Negative.         Objective      Physical Exam  BP (!) 196/81   Pulse 96   Ht 170.2 cm (67.01\")   Wt 69.9 kg (154 lb)   BMI 24.11 kg/m²     Body mass index is 24.11 kg/m².    General:   Mental Status:  Alert   Appearance: Cooperative, in no acute distress   Build and Nutrition: Well-nourished well-developed male   Orientation: Alert and oriented to person, place and time   Posture: Normal   Gait: With a cane    Lower " Extremity:              Left Hip:                          Tenderness:    None                          Range of motion:        External Rotation:       20°                                                              Internal Rotation:        20°                                                              Flexion:                       90°                                                              Extension:                   0°                       Deformities:     None  Functional testing: Negative [facility]    Imaging/Studies  Imaging Results (Last 24 Hours)     Procedure Component Value Units Date/Time    XR Hip With or Without Pelvis 2 - 3 View Left [[id number]] Resulted: 11/17/21 1357     Updated: 11/17/21 1358    Narrative:      Left Hip Radiographs  Indication: left hip pain  Views: low AP pelvis and lateral of the left hip    Comparison: 10/12/2020    Findings:   Advanced arthritis, with bone-on-bone contact, osteophytes at the head   neck junction, no acute bony abnormalities.  No significant worsening   compared to the previous imaging.            Assessment and Plan     Diagnoses and all orders for this visit:    1. Primary osteoarthritis of left hip (Primary)  -     XR Hip With or Without Pelvis 2 - 3 View Left  -     External Facility Surgical/Procedural Request; Future        1. Primary osteoarthritis of left hip        I reviewed my findings with the patient.  He has stable left hip arthritis, which is advanced.  He responded well to an injection about a year ago, and would like to have a repeat injection.  We will schedule that at a mutually convenient time.  He is not interested in surgical intervention.    Return in about 4 weeks (around 12/15/2021) for After Hip Injection.      Melvin Kaba MD  11/17/21  14:02 EST'

## 2022-01-14 ENCOUNTER — OUTSIDE FACILITY SERVICE (OUTPATIENT)
Dept: ORTHOPEDIC SURGERY | Facility: CLINIC | Age: 87
End: 2022-01-14

## 2022-01-14 PROCEDURE — 77002 NEEDLE LOCALIZATION BY XRAY: CPT | Performed by: ORTHOPAEDIC SURGERY

## 2022-01-14 PROCEDURE — 20610 DRAIN/INJ JOINT/BURSA W/O US: CPT | Performed by: ORTHOPAEDIC SURGERY

## 2022-02-09 ENCOUNTER — TELEPHONE (OUTPATIENT)
Dept: ORTHOPEDIC SURGERY | Facility: CLINIC | Age: 87
End: 2022-02-09

## 2022-02-09 NOTE — TELEPHONE ENCOUNTER
Caller: PILO ESPITIA     Relationship to patient: SPOUSE    Best call back number:     Chief complaint: LT HIP     Type of visit: FUP     Requested date: WEEK OF 21ST     If rescheduling, when is the original appointment: 2/14/22    Additional notes:PATIENT HAS A PRIOR APPT THAT SAME DAY AND I OFFERED NEXT AVAILABLE BUT IT WAS IN MARCH AND THEY WANTED TO TRY AND BE SEEN WEEK OF 2/21/22

## 2022-06-23 NOTE — PROGRESS NOTES
"[facility]      Identification: Eric Myrick is a 86 y.o. male who resides in [place], [place]    Reason for visit:  Coronary artery disease involving native coronary artery of      Subjective      Patient is an 86-year-old gentleman who returns today for follow-up of his coronary artery disease, paroxysmal atrial fibrillaton, valvular heart disease, cardioembolic CVA status post loop recorder implant and cardiac risk factors.  Since his last visit he has been having issues with his left hip and receiving injections as he does not desire surgical intervention due to his advanced age.  Patient has no complaints today.  He denies chest pain, dyspnea, orthopnea, palpitations or syncope.  Patient has a loop recorder implant that diagnoses atrial fibrillation after he suffered a CVA back in 2015.  His recorder no longer transmits.  He denies signs or symptoms TIA or CVA.  He is chronically anticoagulated with Eliquis and tolerating without reports of active or overt bleeding.  He is on statin therapy and tolerating without myalgias.  Most recent cholesterol levels were at goal with a total cholesterol 130 and LDL of 62.    Review of Systems   Constitutional: Negative for malaise/fatigue.   Eyes: Negative for vision loss in left eye and vision loss in right eye.   Cardiovascular: Negative for chest pain, dyspnea on exertion, near-syncope, orthopnea, palpitations, paroxysmal nocturnal dyspnea and syncope.   Musculoskeletal: Negative for myalgias.   Neurological: Negative for brief paralysis, excessive daytime sleepiness, focal weakness, numbness, paresthesias and weakness.   All other systems reviewed and are negative.      Objective     /74 (BP Location: Right arm, Patient Position: Sitting, Cuff Size: Adult)   Pulse 91   Ht 170.2 cm (67\")   Wt 71.2 kg (157 lb)   SpO2 96%   BMI 24.59 kg/m²       Constitutional:       Appearance: Healthy appearance. Well-developed.   Eyes:      General: Lids " are normal. No scleral icterus.     Conjunctiva/sclera: Conjunctivae normal.   HENT:      Head: Normocephalic and atraumatic.   Neck:      Thyroid: No thyromegaly.      Vascular: No carotid bruit or JVD.   Pulmonary:      Effort: Pulmonary effort is normal.      Breath sounds: Normal breath sounds. No wheezing. No rhonchi. No rales.   Cardiovascular:      Normal rate. Regular rhythm.      Murmurs: There is no murmur.      No gallop. No rub.   Pulses:     Intact distal pulses.   Edema:     Peripheral edema absent.   Abdominal:      General: There is no distension.      Palpations: Abdomen is soft. There is no abdominal mass.   Musculoskeletal:      Cervical back: Normal range of motion. Skin:     General: Skin is warm and dry.      Findings: No rash.   Neurological:      General: No focal deficit present.      Mental Status: Alert and oriented to person, place, and time.      Gait: Gait is intact.   Psychiatric:         Attention and Perception: Attention normal.         Mood and Affect: Mood normal.         Behavior: Behavior normal.         Result Review :    Lab Results   Component Value Date    GLUCOSE 111 (H) 07/29/2018    BUN 16 07/29/2018    CREATININE 0.83 07/29/2018    EGFRIFNONA 89 07/29/2018    BCR 19.3 07/29/2018    K 4.1 07/29/2018    CO2 28.0 07/29/2018    CALCIUM 8.9 07/29/2018    ALBUMIN 3.99 07/29/2018    AST 15 07/29/2018    ALT 10 07/29/2018     Lab Results   Component Value Date    WBC 8.10 07/29/2018    HGB 14.6 07/29/2018    HCT 45.1 07/29/2018    MCV 94.5 07/29/2018     07/29/2018     Lab Results   Component Value Date    CHLPL 113 11/21/2014    TRIG 95 11/21/2014    HDL 31 (L) 11/21/2014    LDL 62 11/21/2014     Lab Results   Component Value Date    HGBA1C 5.6 11/21/2014         ECG 12 Lead    Date/Time: 6/28/2022 1:53 PM  Performed by: Katharine Nava APRN  Authorized by: Katharine Nava APRN   Comparison: compared with previous ECG   Rhythm: sinus rhythm  Ectopy: infrequent  PVCs  BPM: 68    Clinical impression: abnormal EKG  Comments: Normal sinus rhythm with PVCs  LVH  Lateral and inferior infarct  QT/QTc 424/450 MS             Assessment     Problem List Items Addressed This Visit        Cardiac and Vasculature    Coronary artery disease involving native coronary artery of native heart without angina pectoris - Primary (Chronic)    Overview     · Cardiac catheterization for acute MI, [place], 1992: Angioplasty only.            Current Assessment & Plan     · No signs or symptoms of angina  · Defer aspirin due to chronic anticoagulation with Eliquis           Paroxysmal atrial fibrillation (HCC) (Chronic)    Overview     · Confirmed on loop recorder, 2015  · Chads VASC = 5 (age > 75, CVA, CAD)           Current Assessment & Plan     · No signs or symptoms of CVA or TIA  · Continue Eliquis 5 mg twice daily           Valvular heart disease (Chronic)    Overview     · Echo (3/18/2020): LVEF 60%.  Mild to moderate AI.  Moderate MR.  RVSP < 35 mmHg.           Current Assessment & Plan     · Stable NYHA class II symptoms           Hyperlipidemia LDL goal <100 (Chronic)    Overview     · Recommend moderate intensity statin therapy for stroke prevention           Current Assessment & Plan     · Continue Lipitor 40 mg daily              Miscellaneous    History of loop recorder    Overview     · 11/30/2014: 2 episodes of brief atrial fibrillation              Neuro    History of cardioembolic CVA    Overview     · MCA distribution CVA, 11/2014  · Loop recorder placement, 11 2014  · Confirmation of atrial fibrillation on loop recorder interrogation 2015 with initiation of Eliquis             Is doing well from a cardiovascular standpoint.  He has no signs or symptoms of angina or heart failure.  He is maintaining normal sinus rhythm confirmed by EKG today.  He did have an occasional PVC noted on EKG but patient asymptomatic with it.  We will continue his current medications he will follow-up  in 6 months or sooner if needed.      Plan   • Continue current medications      Follow-up   Return in about 6 months (around 12/28/2022), or if symptoms worsen or fail to improve, for Follow-up with Dr. Ragsdale next visit.      JENSEN Edward    6/28/2022

## 2022-06-28 ENCOUNTER — OFFICE VISIT (OUTPATIENT)
Dept: CARDIOLOGY | Facility: CLINIC | Age: 87
End: 2022-06-28

## 2022-06-28 VITALS
HEART RATE: 91 BPM | OXYGEN SATURATION: 96 % | SYSTOLIC BLOOD PRESSURE: 132 MMHG | HEIGHT: 67 IN | WEIGHT: 157 LBS | BODY MASS INDEX: 24.64 KG/M2 | DIASTOLIC BLOOD PRESSURE: 74 MMHG

## 2022-06-28 DIAGNOSIS — I63.9 ACUTE CVA (CEREBROVASCULAR ACCIDENT): ICD-10-CM

## 2022-06-28 DIAGNOSIS — I48.0 PAROXYSMAL ATRIAL FIBRILLATION: Chronic | ICD-10-CM

## 2022-06-28 DIAGNOSIS — I38 VALVULAR HEART DISEASE: Chronic | ICD-10-CM

## 2022-06-28 DIAGNOSIS — Z98.890 HISTORY OF LOOP RECORDER: ICD-10-CM

## 2022-06-28 DIAGNOSIS — I25.10 CORONARY ARTERY DISEASE INVOLVING NATIVE CORONARY ARTERY OF NATIVE HEART WITHOUT ANGINA PECTORIS: Primary | Chronic | ICD-10-CM

## 2022-06-28 DIAGNOSIS — E78.5 HYPERLIPIDEMIA LDL GOAL <100: Chronic | ICD-10-CM

## 2022-06-28 PROCEDURE — 93000 ELECTROCARDIOGRAM COMPLETE: CPT | Performed by: NURSE PRACTITIONER

## 2022-06-28 PROCEDURE — 99214 OFFICE O/P EST MOD 30 MIN: CPT | Performed by: NURSE PRACTITIONER

## 2023-07-26 ENCOUNTER — OFFICE VISIT (OUTPATIENT)
Dept: ORTHOPEDIC SURGERY | Facility: CLINIC | Age: 88
End: 2023-07-26
Payer: MEDICARE

## 2023-07-26 VITALS — DIASTOLIC BLOOD PRESSURE: 58 MMHG | SYSTOLIC BLOOD PRESSURE: 116 MMHG

## 2023-07-26 DIAGNOSIS — M16.11 PRIMARY OSTEOARTHRITIS OF RIGHT HIP: Primary | ICD-10-CM

## 2023-07-26 PROCEDURE — 99212 OFFICE O/P EST SF 10 MIN: CPT | Performed by: ORTHOPAEDIC SURGERY

## 2023-07-26 NOTE — PROGRESS NOTES
[facility] [place] [place] Orthopaedic Surgery Clinic Note    Subjective     Chief Complaint   Patient presents with    Right Hip - Pain        HPI    Eric Myrick is a 87 y.o. male who presents with new problem of: right hip pain.  Onset: atraumatic and gradual in nature. The issue has been ongoing for 6 month(s). Pain is a 6/10 on the pain scale. Pain is described as aching and stabbing. Associated symptoms include pain and stiffness. The pain is worse with walking, standing, climbing stairs, and sleeping; resting and assistive device (cane/walker) improve the pain. Previous treatments have included: cane/walker.  He would like an intra-articular hip injection.    I have reviewed the following portions of the patient's history and agree with: History of Present Illness and Review of Systems    Patient Active Problem List   Diagnosis    History of cardioembolic CVA    History of loop recorder    Coronary artery disease involving native coronary artery of native heart without angina pectoris    Hyperlipidemia LDL goal <100    GERD (gastroesophageal reflux disease)    Obstructive sleep apnea on CPAP    Paroxysmal atrial fibrillation    Valvular heart disease     Past Medical History:   Diagnosis Date    Acute CVA (cerebrovascular accident) 11/20/2014    Atrial fibrillation     Former tobacco use     GERD (gastroesophageal reflux disease)     H/O acute myocardial infarction 12/17/1992    History of loop recorder 11/30/2014    TWO EPISODES OF BRIEF ATRIAL FIBRILLATION    Hyperlipidemia     Obstructive sleep apnea on CPAP       Past Surgical History:   Procedure Laterality Date    CATARACT EXTRACTION      CORONARY ANGIOPLASTY Left 12/17/1992    ANGIOPLASTY ONLY; DATA DEFICIT    HERNIA REPAIR Bilateral     THROMBECTOMY        Family History   Problem Relation Age of Onset    Cancer Mother     No Known Problems Father      Social History     Socioeconomic History    Marital status:    Tobacco Use    Smoking status: Former      Types: Cigarettes    Smokeless tobacco: Never    Tobacco comments:     quit 45 years ago   Vaping Use    Vaping Use: Never used   Substance and Sexual Activity    Alcohol use: No    Drug use: No    Sexual activity: Defer      Current Outpatient Medications on File Prior to Visit   Medication Sig Dispense Refill    alfuzosin (UROXATRAL) 10 MG 24 hr tablet       apixaban (ELIQUIS) 5 MG tablet tablet Take 1 tablet by mouth Every 12 (Twelve) Hours. 180 tablet 3    atorvastatin (LIPITOR) 40 MG tablet Take 1 tablet by mouth Daily.      azelastine (ASTELIN) 0.1 % nasal spray Spray 2 sprays twice a day by intranasal route for 30 days.      B Complex Vitamins (VITAMIN B COMPLEX PO) Take  by mouth Daily.      cetirizine (zyrTEC) 5 MG tablet Take 1 tablet by mouth Daily for 30 days. 30 tablet 0    chlorhexidine (PERIDEX) 0.12 % solution       finasteride (PROSCAR) 5 MG tablet       fluticasone (FLONASE) 50 MCG/ACT nasal spray fluticasone propionate 50 mcg/actuation nasal spray,suspension   2 sprays each nostril once daily      ketorolac (ACULAR) 0.4 % solution ketorolac 0.4 % eye drops      Multiple Vitamins-Minerals (MULTIVITAMIN ADULT PO) Take  by mouth Daily.      ofloxacin (OCUFLOX) 0.3 % ophthalmic solution       omeprazole (PriLOSEC) 40 MG capsule Every Night.      prednisoLONE acetate (PRED FORTE) 1 % ophthalmic suspension       psyllium (METAMUCIL) 58.6 % packet Take 1 packet by mouth Daily.       No current facility-administered medications on file prior to visit.      No Known Allergies     Review of Systems   Constitutional:  Negative for activity change, appetite change, chills, diaphoresis, fatigue, fever and unexpected weight change.   HENT:  Negative for congestion, dental problem, drooling, ear discharge, ear pain, facial swelling, hearing loss, mouth sores, nosebleeds, postnasal drip, rhinorrhea, sinus pressure, sneezing, sore throat, tinnitus, trouble swallowing and voice change.    Eyes:  Negative for  photophobia, pain, discharge, redness, itching and visual disturbance.   Respiratory:  Negative for apnea, cough, choking, chest tightness, shortness of breath, wheezing and stridor.    Cardiovascular:  Negative for chest pain, palpitations and leg swelling.   Gastrointestinal:  Negative for abdominal distention, abdominal pain, anal bleeding, blood in stool, constipation, diarrhea, nausea, rectal pain and vomiting.   Endocrine: Negative for cold intolerance, heat intolerance, polydipsia, polyphagia and polyuria.   Genitourinary:  Negative for decreased urine volume, difficulty urinating, dysuria, enuresis, flank pain, frequency, genital sores, hematuria and urgency.   Musculoskeletal:  Positive for arthralgias. Negative for back pain, gait problem, joint swelling, myalgias, neck pain and neck stiffness.   Skin:  Negative for color change, pallor, rash and wound.   Allergic/Immunologic: Negative for environmental allergies, food allergies and immunocompromised state.   Neurological:  Negative for dizziness, tremors, seizures, syncope, facial asymmetry, speech difficulty, weakness, light-headedness, numbness and headaches.   Hematological:  Negative for adenopathy. Does not bruise/bleed easily.   Psychiatric/Behavioral:  Negative for agitation, behavioral problems, confusion, decreased concentration, dysphoric mood, hallucinations, self-injury, sleep disturbance and suicidal ideas. The patient is not nervous/anxious and is not hyperactive.       Objective      Physical Exam  /58     There is no height or weight on file to calculate BMI.    General:   Mental Status:  Alert   Appearance: Cooperative, in no acute distress   Build and Nutrition: Well-nourished well-developed male   Orientation: Alert and oriented to person, place and time   Posture: Normal   Gait: Limp both lower extremities    Lower Extremity:   Right Hip:    Tenderness:  None    Swelling:  None    Crepitus:  None    Range of motion:  External  Rotation: 30°       Internal Rotation: 30°       Flexion:  100°       Extension:  0°    Deformities:  None  Functional testing: Negative [facility]    No leg length discrepancy      Imaging/Studies      Imaging Results (Last 24 Hours)       ** No results found for the last 24 hours. **          No new imaging today.  The imaging from 7/13/2023 was reviewed, which showed arthritic changes in the hip.    Assessment and Plan     Diagnoses and all orders for this visit:    1. Primary osteoarthritis of right hip (Primary)  -     External Facility Surgical/Procedural Request; Future        1. Primary osteoarthritis of right hip        Reviewed my findings with the patient and his wife.  We discussed treatment options today, and he would like to proceed with an intra-articular hip injection.  We will schedule that at a mutually convenient time.    Return in about 4 weeks (around 8/23/2023) for After Hip Injection.      Melvin Kaba MD  07/26/23  14:59 EDT

## 2023-07-28 ENCOUNTER — OUTSIDE FACILITY SERVICE (OUTPATIENT)
Dept: ORTHOPEDIC SURGERY | Facility: CLINIC | Age: 88
End: 2023-07-28
Payer: MEDICARE

## 2023-07-28 ENCOUNTER — DOCUMENTATION (OUTPATIENT)
Dept: ORTHOPEDIC SURGERY | Facility: CLINIC | Age: 88
End: 2023-07-28

## 2023-07-28 NOTE — PROGRESS NOTES
[facility] Orthopa[facility] Surgery and Sports Medicine    Operative Report    [facility]  [address], [address]  [place] [address]    DATE OF PROCEDURE: 07/28/23    PREOPERATIVE DIAGNOSIS: right hip arthritis    POSTOPERATIVE DIAGNOSIS:  right hip arthritis    PROCEDURES PERFORMED:   right hip injection under fluoroscopic guidance    SURGEON: Melvin Kaba MD    SPECIMENS: None    ESTIMATED BLOOD LOSS: None    COMPLICATIONS: None    INDICATIONS: The patient is a 87 y.o. male with right hip pain secondary to osteoarthritis that failed to improve in spite of conservative treatment.  Options have been discussed at length with the patient, and patient has reached the point where the patient desires to proceed with an intra-articular hip injection understanding the risks, benefits, and alternatives. Consent was obtained. Please see my office notes for details with regard to preprocedure counseling and rationale.     DESCRIPTION OF PROCEDURE: The patient was positively identified in the preoperative holding area and brought to the operating suite and placed in a supine position.  Timeout procedure was performed to confirm the injection site, as well as other parameters. Following the sterile prep and drape, a 20-gauge spinal needle was placed into the hip joint, and confirmed to be in an intra-articular location with radiographic dye, and subsequently infiltrated with 40 mg of Kenalog mixed with ropivacaine.    The patient tolerated the procedure well and was brought to the recovery room in good condition.  The patient noted 99% improvement after walking from the operating room to the recovery room.    PLAN:  1.  The patient will keep a written or mental diary of how well the injection works and for how long.  2.  Follow up in 4 weeks as planned in the office.    Future Appointments   Date Time Provider Department [place]   9/6/2023  3:20 PM Melvin Kaba MD [facility]   10/3/2023   2:45 PM Manuel Ragsdale IV, MD [facility]       Melvin Kaba MD  07/28/23  08:48 EDT

## 2023-09-18 ENCOUNTER — HOSPITAL ENCOUNTER (EMERGENCY)
Facility: HOSPITAL | Age: 88
Discharge: HOME | End: 2023-09-19
Attending: EMERGENCY MEDICINE | Admitting: EMERGENCY MEDICINE

## 2023-09-18 ENCOUNTER — APPOINTMENT (OUTPATIENT)
Dept: CT IMAGING | Facility: HOSPITAL | Age: 88
End: 2023-09-18

## 2023-09-18 DIAGNOSIS — R79.89 ELEVATED TROPONIN: ICD-10-CM

## 2023-09-18 DIAGNOSIS — R11.2 NAUSEA AND VOMITING, UNSPECIFIED VOMITING TYPE: ICD-10-CM

## 2023-09-18 DIAGNOSIS — R31.9 HEMATURIA, UNSPECIFIED TYPE: ICD-10-CM

## 2023-09-18 DIAGNOSIS — R55 SYNCOPE, UNSPECIFIED SYNCOPE TYPE: Primary | ICD-10-CM

## 2023-09-18 DIAGNOSIS — E83.51 HYPOCALCEMIA: ICD-10-CM

## 2023-09-18 DIAGNOSIS — R77.8 ELEVATED TROPONIN: ICD-10-CM

## 2023-09-18 LAB
ALBUMIN SERPL-MCNC: 3.5 G/DL (ref 3.5–5.2)
ALBUMIN/GLOB SERPL: 1.5 G/DL
ALP SERPL-CCNC: 123 U/L (ref 39–117)
ALT SERPL W P-5'-P-CCNC: 9 U/L (ref 1–41)
ANION GAP SERPL CALCULATED.3IONS-SCNC: 9 MMOL/L (ref 5–15)
AST SERPL-CCNC: 16 U/L (ref 1–40)
B PARAPERT DNA SPEC QL NAA+PROBE: NOT DETECTED
B PERT DNA SPEC QL NAA+PROBE: NOT DETECTED
BACTERIA UR QL AUTO: ABNORMAL /HPF
BASOPHILS # BLD AUTO: 0.03 10*3/MM3 (ref 0–0.2)
BASOPHILS NFR BLD AUTO: 0.5 % (ref 0–1.5)
BILIRUB SERPL-MCNC: 0.7 MG/DL (ref 0–1.2)
BILIRUB UR QL STRIP: NEGATIVE
BUN SERPL-MCNC: 14 MG/DL (ref 8–23)
BUN/CREAT SERPL: 16.5 (ref 7–25)
C PNEUM DNA NPH QL NAA+NON-PROBE: NOT DETECTED
CALCIUM SPEC-SCNC: 8.3 MG/DL (ref 8.6–10.5)
CHLORIDE SERPL-SCNC: 106 MMOL/L (ref 98–107)
CLARITY UR: CLEAR
CO2 SERPL-SCNC: 23 MMOL/L (ref 22–29)
COLOR UR: YELLOW
CREAT BLDA-MCNC: 0.9 MG/DL
CREAT BLDA-MCNC: 0.9 MG/DL (ref 0.6–1.3)
CREAT SERPL-MCNC: 0.85 MG/DL (ref 0.76–1.27)
DEPRECATED RDW RBC AUTO: 48.1 FL (ref 37–54)
EGFRCR SERPLBLD CKD-EPI 2021: 84.1 ML/MIN/1.73
EOSINOPHIL # BLD AUTO: 0.1 10*3/MM3 (ref 0–0.4)
EOSINOPHIL NFR BLD AUTO: 1.7 % (ref 0.3–6.2)
ERYTHROCYTE [DISTWIDTH] IN BLOOD BY AUTOMATED COUNT: 14.3 % (ref 12.3–15.4)
FLUAV SUBTYP SPEC NAA+PROBE: NOT DETECTED
FLUBV RNA ISLT QL NAA+PROBE: NOT DETECTED
GLOBULIN UR ELPH-MCNC: 2.4 GM/DL
GLUCOSE SERPL-MCNC: 149 MG/DL (ref 65–99)
GLUCOSE UR STRIP-MCNC: NEGATIVE MG/DL
HADV DNA SPEC NAA+PROBE: NOT DETECTED
HCOV 229E RNA SPEC QL NAA+PROBE: NOT DETECTED
HCOV HKU1 RNA SPEC QL NAA+PROBE: NOT DETECTED
HCOV NL63 RNA SPEC QL NAA+PROBE: NOT DETECTED
HCOV OC43 RNA SPEC QL NAA+PROBE: NOT DETECTED
HCT VFR BLD AUTO: 39.8 % (ref 37.5–51)
HGB BLD-MCNC: 12.8 G/DL (ref 13–17.7)
HGB UR QL STRIP.AUTO: ABNORMAL
HMPV RNA NPH QL NAA+NON-PROBE: NOT DETECTED
HOLD SPECIMEN: NORMAL
HOLD SPECIMEN: NORMAL
HPIV1 RNA ISLT QL NAA+PROBE: NOT DETECTED
HPIV2 RNA SPEC QL NAA+PROBE: NOT DETECTED
HPIV3 RNA NPH QL NAA+PROBE: NOT DETECTED
HPIV4 P GENE NPH QL NAA+PROBE: NOT DETECTED
HYALINE CASTS UR QL AUTO: ABNORMAL /LPF
IMM GRANULOCYTES # BLD AUTO: 0.04 10*3/MM3 (ref 0–0.05)
IMM GRANULOCYTES NFR BLD AUTO: 0.7 % (ref 0–0.5)
KETONES UR QL STRIP: ABNORMAL
LEUKOCYTE ESTERASE UR QL STRIP.AUTO: NEGATIVE
LIPASE SERPL-CCNC: 19 U/L (ref 13–60)
LYMPHOCYTES # BLD AUTO: 1.11 10*3/MM3 (ref 0.7–3.1)
LYMPHOCYTES NFR BLD AUTO: 18.3 % (ref 19.6–45.3)
M PNEUMO IGG SER IA-ACNC: NOT DETECTED
MAGNESIUM SERPL-MCNC: 2.2 MG/DL (ref 1.6–2.4)
MCH RBC QN AUTO: 29.6 PG (ref 26.6–33)
MCHC RBC AUTO-ENTMCNC: 32.2 G/DL (ref 31.5–35.7)
MCV RBC AUTO: 92.1 FL (ref 79–97)
MONOCYTES # BLD AUTO: 0.42 10*3/MM3 (ref 0.1–0.9)
MONOCYTES NFR BLD AUTO: 6.9 % (ref 5–12)
NEUTROPHILS NFR BLD AUTO: 4.35 10*3/MM3 (ref 1.7–7)
NEUTROPHILS NFR BLD AUTO: 71.9 % (ref 42.7–76)
NITRITE UR QL STRIP: NEGATIVE
NRBC BLD AUTO-RTO: 0 /100 WBC (ref 0–0.2)
PH UR STRIP.AUTO: 6.5 [PH] (ref 5–8)
PLATELET # BLD AUTO: 155 10*3/MM3 (ref 140–450)
PMV BLD AUTO: 10.7 FL (ref 6–12)
POTASSIUM SERPL-SCNC: 3.9 MMOL/L (ref 3.5–5.2)
PROT SERPL-MCNC: 5.9 G/DL (ref 6–8.5)
PROT UR QL STRIP: NEGATIVE
RBC # BLD AUTO: 4.32 10*6/MM3 (ref 4.14–5.8)
RBC # UR STRIP: ABNORMAL /HPF
REF LAB TEST METHOD: ABNORMAL
RENAL EPI CELLS #/AREA URNS HPF: ABNORMAL /HPF
RHINOVIRUS RNA SPEC NAA+PROBE: NOT DETECTED
RSV RNA NPH QL NAA+NON-PROBE: NOT DETECTED
SARS-COV-2 RNA NPH QL NAA+NON-PROBE: NOT DETECTED
SODIUM SERPL-SCNC: 138 MMOL/L (ref 136–145)
SP GR UR STRIP: 1.04 (ref 1–1.03)
SQUAMOUS #/AREA URNS HPF: ABNORMAL /HPF
TROPONIN T SERPL HS-MCNC: 27 NG/L
UROBILINOGEN UR QL STRIP: ABNORMAL
WBC # UR STRIP: ABNORMAL /HPF
WBC NRBC COR # BLD: 6.05 10*3/MM3 (ref 3.4–10.8)
WHOLE BLOOD HOLD COAG: NORMAL
WHOLE BLOOD HOLD SPECIMEN: NORMAL

## 2023-09-18 PROCEDURE — 99285 EMERGENCY DEPT VISIT HI MDM: CPT

## 2023-09-18 PROCEDURE — 74177 CT ABD & PELVIS W/CONTRAST: CPT

## 2023-09-18 PROCEDURE — 93005 ELECTROCARDIOGRAM TRACING: CPT | Performed by: EMERGENCY MEDICINE

## 2023-09-18 PROCEDURE — P9612 CATHETERIZE FOR URINE SPEC: HCPCS

## 2023-09-18 PROCEDURE — 0202U NFCT DS 22 TRGT SARS-COV-2: CPT | Performed by: EMERGENCY MEDICINE

## 2023-09-18 PROCEDURE — 25010000002 ONDANSETRON PER 1 MG: Performed by: EMERGENCY MEDICINE

## 2023-09-18 PROCEDURE — 25510000001 IOPAMIDOL 61 % SOLUTION: Performed by: EMERGENCY MEDICINE

## 2023-09-18 PROCEDURE — 84484 ASSAY OF TROPONIN QUANT: CPT | Performed by: EMERGENCY MEDICINE

## 2023-09-18 PROCEDURE — 81001 URINALYSIS AUTO W/SCOPE: CPT | Performed by: EMERGENCY MEDICINE

## 2023-09-18 PROCEDURE — 96374 THER/PROPH/DIAG INJ IV PUSH: CPT

## 2023-09-18 PROCEDURE — 85025 COMPLETE CBC W/AUTO DIFF WBC: CPT | Performed by: EMERGENCY MEDICINE

## 2023-09-18 PROCEDURE — 83690 ASSAY OF LIPASE: CPT | Performed by: EMERGENCY MEDICINE

## 2023-09-18 PROCEDURE — 80053 COMPREHEN METABOLIC PANEL: CPT | Performed by: EMERGENCY MEDICINE

## 2023-09-18 PROCEDURE — 82565 ASSAY OF CREATININE: CPT

## 2023-09-18 PROCEDURE — 70450 CT HEAD/BRAIN W/O DYE: CPT

## 2023-09-18 PROCEDURE — 83735 ASSAY OF MAGNESIUM: CPT | Performed by: EMERGENCY MEDICINE

## 2023-09-18 PROCEDURE — 0 DIATRIZOATE MEGLUMINE & SODIUM PER 1 ML

## 2023-09-18 RX ORDER — SODIUM CHLORIDE 0.9 % (FLUSH) 0.9 %
10 SYRINGE (ML) INJECTION AS NEEDED
Status: DISCONTINUED | OUTPATIENT
Start: 2023-09-18 | End: 2023-09-19 | Stop reason: HOSPADM

## 2023-09-18 RX ORDER — ONDANSETRON 2 MG/ML
4 INJECTION INTRAMUSCULAR; INTRAVENOUS ONCE
Status: COMPLETED | OUTPATIENT
Start: 2023-09-18 | End: 2023-09-18

## 2023-09-18 RX ADMIN — IOPAMIDOL 94 ML: 612 INJECTION, SOLUTION INTRAVENOUS at 21:54

## 2023-09-18 RX ADMIN — DIATRIZOATE MEGLUMINE AND DIATRIZOATE SODIUM 15 ML: 660; 100 LIQUID ORAL; RECTAL at 20:46

## 2023-09-18 RX ADMIN — ONDANSETRON 4 MG: 2 INJECTION INTRAMUSCULAR; INTRAVENOUS at 20:15

## 2023-09-18 RX ADMIN — SODIUM CHLORIDE 500 ML: 9 INJECTION, SOLUTION INTRAVENOUS at 20:14

## 2023-09-19 VITALS
DIASTOLIC BLOOD PRESSURE: 66 MMHG | SYSTOLIC BLOOD PRESSURE: 144 MMHG | HEART RATE: 63 BPM | OXYGEN SATURATION: 93 % | HEIGHT: 67 IN | WEIGHT: 160 LBS | RESPIRATION RATE: 18 BRPM | TEMPERATURE: 98.1 F | BODY MASS INDEX: 25.11 KG/M2

## 2023-09-19 LAB
HOLD SPECIMEN: NORMAL
TROPONIN T SERPL HS-MCNC: 18 NG/L

## 2023-09-19 PROCEDURE — 84484 ASSAY OF TROPONIN QUANT: CPT | Performed by: EMERGENCY MEDICINE

## 2023-09-19 PROCEDURE — 36415 COLL VENOUS BLD VENIPUNCTURE: CPT

## 2023-09-19 NOTE — ED PROVIDER NOTES
Subjective   History of Present Illness  Patient is an 87 year old male presenting today after a witnessed syncopal episode. His wife states that while playing bingo, he lost consciousness and slumped to the side in his chair. He remained unconscious for about 30 seconds to 1 minute. After regaining consciousness, she states he was sweating profusely. Patient states before going to bingo, he ate a meal and became very nauseous. States he vomited about 1 cup at that time. While in the ED, patient states he is still nauseous and vomited.   Patient states he is weak and does have mild abdominal pain. He states he has done nothing out of his ordinary routine today and has done nothing to cause him to be overexerted. Patient denies fever or chills. He also denies recent illness or exposure to any sick contacts. He is AOx3 and interactive, but does appear ill and have facial pallor. Lungs are CTAB, heart has normal rate and rhythm, no MRG.     Review of Systems   All other systems reviewed and are negative.    Past Medical History:   Diagnosis Date    Acute CVA (cerebrovascular accident) 11/20/2014    Atrial fibrillation     Former tobacco use     GERD (gastroesophageal reflux disease)     H/O acute myocardial infarction 12/17/1992    History of loop recorder 11/30/2014    TWO EPISODES OF BRIEF ATRIAL FIBRILLATION    Hyperlipidemia     Obstructive sleep apnea on CPAP        No Known Allergies    Past Surgical History:   Procedure Laterality Date    CATARACT EXTRACTION      CORONARY ANGIOPLASTY Left 12/17/1992    ANGIOPLASTY ONLY; DATA DEFICIT    HERNIA REPAIR Bilateral     THROMBECTOMY         Family History   Problem Relation Age of Onset    Cancer Mother     No Known Problems Father        Social History     Socioeconomic History    Marital status:    Tobacco Use    Smoking status: Former     Types: Cigarettes    Smokeless tobacco: Never    Tobacco comments:     quit 45 years ago   Vaping Use    Vaping Use: Never  used   Substance and Sexual Activity    Alcohol use: No    Drug use: No    Sexual activity: Defer           Objective   Physical Exam  Vitals and nursing note reviewed.   Constitutional:       Appearance: He is ill-appearing (Elderly. Frail).      Comments: Appears nauseated and uncomfortable, but no acute distress   HENT:      Head: Normocephalic and atraumatic.      Mouth/Throat:      Mouth: Mucous membranes are moist. Mucous membranes are dry.   Eyes:      Extraocular Movements: Extraocular movements intact.      Conjunctiva/sclera: Conjunctivae normal.      Pupils: Pupils are equal, round, and reactive to light.   Cardiovascular:      Rate and Rhythm: Normal rate and regular rhythm.      Pulses: Normal pulses.      Heart sounds: Normal heart sounds. No murmur heard.  Pulmonary:      Effort: Pulmonary effort is normal. No respiratory distress.      Breath sounds: Normal breath sounds. No wheezing or rhonchi.   Abdominal:      General: Bowel sounds are normal. There is no distension.      Palpations: Abdomen is soft.      Tenderness: There is no abdominal tenderness. There is no guarding or rebound.   Musculoskeletal:         General: No swelling, deformity or signs of injury. Normal range of motion.      Cervical back: Normal range of motion.   Skin:     General: Skin is warm and dry.      Capillary Refill: Capillary refill takes less than 2 seconds.   Neurological:      General: No focal deficit present.      Mental Status: He is alert and oriented to person, place, and time. Mental status is at baseline.   Psychiatric:         Mood and Affect: Mood normal.         Behavior: Behavior normal.         Thought Content: Thought content normal.       Procedures           ED Course  ED Course as of 09/19/23 1554   Tue Sep 19, 2023   0108 HS Troponin T(!): 27 [CP]   0114 Stopped taking eliquis 2 weeks ago. [CP]      ED Course User Index  [CP] Martin Bryan DO      Recent Results (from the past 24 hour(s))    Comprehensive Metabolic Panel    Collection Time: 09/18/23  8:14 PM    Specimen: Blood   Result Value Ref Range    Glucose 149 (H) 65 - 99 mg/dL    BUN 14 8 - 23 mg/dL    Creatinine 0.85 0.76 - 1.27 mg/dL    Sodium 138 136 - 145 mmol/L    Potassium 3.9 3.5 - 5.2 mmol/L    Chloride 106 98 - 107 mmol/L    CO2 23.0 22.0 - 29.0 mmol/L    Calcium 8.3 (L) 8.6 - 10.5 mg/dL    Total Protein 5.9 (L) 6.0 - 8.5 g/dL    Albumin 3.5 3.5 - 5.2 g/dL    ALT (SGPT) 9 1 - 41 U/L    AST (SGOT) 16 1 - 40 U/L    Alkaline Phosphatase 123 (H) 39 - 117 U/L    Total Bilirubin 0.7 0.0 - 1.2 mg/dL    Globulin 2.4 gm/dL    A/G Ratio 1.5 g/dL    BUN/Creatinine Ratio 16.5 7.0 - 25.0    Anion Gap 9.0 5.0 - 15.0 mmol/L    eGFR 84.1 >60.0 mL/min/1.73   Magnesium    Collection Time: 09/18/23  8:14 PM    Specimen: Blood   Result Value Ref Range    Magnesium 2.2 1.6 - 2.4 mg/dL   Single High Sensitivity Troponin T    Collection Time: 09/18/23  8:14 PM    Specimen: Blood   Result Value Ref Range    HS Troponin T 27 (H) <15 ng/L   Green Top (Gel)    Collection Time: 09/18/23  8:14 PM   Result Value Ref Range    Extra Tube Hold for add-ons.    Lavender Top    Collection Time: 09/18/23  8:14 PM   Result Value Ref Range    Extra Tube hold for add-on    Gold Top - SST    Collection Time: 09/18/23  8:14 PM   Result Value Ref Range    Extra Tube Hold for add-ons.    Gray Top    Collection Time: 09/18/23  8:14 PM   Result Value Ref Range    Extra Tube Hold for add-ons.    Light Blue Top    Collection Time: 09/18/23  8:14 PM   Result Value Ref Range    Extra Tube Hold for add-ons.    CBC Auto Differential    Collection Time: 09/18/23  8:14 PM    Specimen: Blood   Result Value Ref Range    WBC 6.05 3.40 - 10.80 10*3/mm3    RBC 4.32 4.14 - 5.80 10*6/mm3    Hemoglobin 12.8 (L) 13.0 - 17.7 g/dL    Hematocrit 39.8 37.5 - 51.0 %    MCV 92.1 79.0 - 97.0 fL    MCH 29.6 26.6 - 33.0 pg    MCHC 32.2 31.5 - 35.7 g/dL    RDW 14.3 12.3 - 15.4 %    RDW-SD 48.1 37.0 - 54.0  fl    MPV 10.7 6.0 - 12.0 fL    Platelets 155 140 - 450 10*3/mm3    Neutrophil % 71.9 42.7 - 76.0 %    Lymphocyte % 18.3 (L) 19.6 - 45.3 %    Monocyte % 6.9 5.0 - 12.0 %    Eosinophil % 1.7 0.3 - 6.2 %    Basophil % 0.5 0.0 - 1.5 %    Immature Grans % 0.7 (H) 0.0 - 0.5 %    Neutrophils, Absolute 4.35 1.70 - 7.00 10*3/mm3    Lymphocytes, Absolute 1.11 0.70 - 3.10 10*3/mm3    Monocytes, Absolute 0.42 0.10 - 0.90 10*3/mm3    Eosinophils, Absolute 0.10 0.00 - 0.40 10*3/mm3    Basophils, Absolute 0.03 0.00 - 0.20 10*3/mm3    Immature Grans, Absolute 0.04 0.00 - 0.05 10*3/mm3    nRBC 0.0 0.0 - 0.2 /100 WBC   Lipase    Collection Time: 09/18/23  8:14 PM    Specimen: Blood   Result Value Ref Range    Lipase 19 13 - 60 U/L   Respiratory Panel PCR w/COVID-19(SARS-CoV-2) TIM/ARON/GORDON/PAD/COR/MAD/HEIDI In-House, NP Swab in [facility], 3-4 HR TAT - Swab, Nasopharynx    Collection Time: 09/18/23  8:20 PM    Specimen: Nasopharynx; Swab   Result Value Ref Range    ADENOVIRUS, PCR Not Detected Not Detected    Coronavirus 229E Not Detected Not Detected    Coronavirus HKU1 Not Detected Not Detected    Coronavirus NL63 Not Detected Not Detected    Coronavirus OC43 Not Detected Not Detected    COVID19 Not Detected Not Detected - Ref. Range    Human Metapneumovirus Not Detected Not Detected    Human Rhinovirus/Enterovirus Not Detected Not Detected    Influenza A PCR Not Detected Not Detected    Influenza B PCR Not Detected Not Detected    Parainfluenza Virus 1 Not Detected Not Detected    Parainfluenza Virus 2 Not Detected Not Detected    Parainfluenza Virus 3 Not Detected Not Detected    Parainfluenza Virus 4 Not Detected Not Detected    RSV, PCR Not Detected Not Detected    Bordetella pertussis pcr Not Detected Not Detected    Bordetella parapertussis PCR Not Detected Not Detected    Chlamydophila pneumoniae PCR Not Detected Not Detected    Mycoplasma pneumo by PCR Not Detected Not Detected   POC Creatinine    Collection Time: 09/18/23  8:20  PM    Specimen: Blood   Result Value Ref Range    Creatinine 0.90 0.60 - 1.30 mg/dL   POCT, Creatinine    Collection Time: 09/18/23  8:30 PM    Specimen: Blood   Result Value Ref Range    Creatinine 0.90 mg/dL   ECG 12 Lead Syncope    Collection Time: 09/18/23  8:30 PM   Result Value Ref Range    QT Interval 468 ms    QTC Interval 471 ms   Urinalysis With Culture If Indicated - Straight Cath    Collection Time: 09/18/23 10:37 PM    Specimen: Straight Cath; Urine   Result Value Ref Range    Color, UA Yellow Yellow, Straw    Appearance, UA Clear Clear    pH, UA 6.5 5.0 - 8.0    Specific Gravity, UA 1.039 (H) 1.001 - 1.030    Glucose, UA Negative Negative    Ketones, UA Trace (A) Negative    Bilirubin, UA Negative Negative    Blood, UA Moderate (2+) (A) Negative    Protein, UA Negative Negative    Leuk Esterase, UA Negative Negative    Nitrite, UA Negative Negative    Urobilinogen, UA 0.2 E.U./dL 0.2 - 1.0 E.U./dL   Urinalysis, Microscopic Only - Straight Cath    Collection Time: 09/18/23 10:37 PM    Specimen: Straight Cath; Urine   Result Value Ref Range    RBC, UA 3-6 (A) None Seen, 0-2 /HPF    WBC, UA 0-2 None Seen, 0-2 /HPF    Bacteria, UA None Seen None Seen, Trace /HPF    Squamous Epithelial Cells, UA 0-2 None Seen, 0-2 /HPF    Renal Epithelial Cells, UA 0-2 0 - 2 /HPF    Hyaline Casts, UA 0-6 0 - 6 /LPF    Methodology Manual Light Microscopy    High Sensitivity Troponin T    Collection Time: 09/19/23  1:18 AM    Specimen: Blood   Result Value Ref Range    HS Troponin T 18 (H) <15 ng/L     Note: In addition to lab results from this visit, the labs listed above may include labs taken at another facility or during a different encounter within the last 24 hours. Please correlate lab times with ED admission and discharge times for further clarification of the services performed during this visit.    CT Head Without Contrast   Final Result   1.No acute intracranial hemorrhage. Calvarium is intact.   2.Chronic findings  as detailed above.                  Electronically Signed: Justin Rodarte MD     9/18/2023 11:03 PM EDT     Workstation ID: [id number]      CT Abdomen Pelvis With Contrast   Final Result   Impression:      1. No acute process seen within the abdomen or pelvis.   2. Normal appendix            Electronically Signed: Darwin Foy MD     9/18/2023 10:56 PM EDT     Workstation ID: [id number]        Vitals:    09/19/23 0011 09/19/23 0100 09/19/23 0130 09/19/23 0145   BP: 140/78 139/67 146/71 144/66   Patient Position: Standing      Pulse: 82 64 67 63   Resp:       Temp:       TempSrc:       SpO2:  94% 95% 93%   Weight:       Height:         Medications   ondansetron (ZOFRAN) injection 4 mg (4 mg Intravenous Given 9/18/23 2015)   sodium chloride 0.9 % bolus 500 mL (0 mL Intravenous Stopped 9/18/23 2044)   diatrizoate meglumine-sodium (GASTROGRAFIN) 66-10 % oral solution 15 mL (15 mL Oral Given 9/18/23 2046)   iopamidol (ISOVUE-300) 61 % injection 100 mL (94 mL Intravenous Given 9/18/23 2154)     ECG/EMG Results (last 24 hours)       ** No results found for the last 24 hours. **          ECG 12 Lead Syncope   Preliminary Result   Test Reason : Syncope   Blood Pressure :   */*   mmHG   Vent. Rate :  61 BPM     Atrial Rate :  61 BPM      P-R Int : 196 ms          QRS Dur : 120 ms       QT Int : 468 ms       P-R-T Axes :  45  -6 122 degrees      QTc Int : 471 ms      Sinus rhythm with premature supraventricular complexes and with occasional    premature ventricular complexes   Inferior infarct (cited on or before 20-NOV-2014)   Anterolateral infarct , age undetermined   Abnormal ECG   When compared with ECG of 29-JUL-2018 12:33,   premature ventricular complexes are now present   premature supraventricular complexes are now present   Anterior infarct is now present   Anterolateral infarct is now present   Nonspecific T wave abnormality, improved in Anterior leads      Referred By: IRMAMD           Confirmed By:                                                 Medical Decision Making  PT feels better, is not orthostatic, is ambulating at baseline.  He and family are comfortable with DC at this time.  He understands to have a low threshold to return to the ED if symptoms persist, worsen, or other concerns arise.    Problems Addressed:  Elevated troponin: complicated acute illness or injury  Hematuria, unspecified type: complicated acute illness or injury  Hypocalcemia: complicated acute illness or injury  Nausea and vomiting, unspecified vomiting type: complicated acute illness or injury  Syncope, unspecified syncope type: complicated acute illness or injury    Amount and/or Complexity of Data Reviewed  External Data Reviewed: notes.  Labs: ordered. Decision-making details documented in ED Course.  Radiology: ordered and independent interpretation performed. Decision-making details documented in ED Course.  ECG/medicine tests: ordered and independent interpretation performed. Decision-making details documented in ED Course.    Risk  Prescription drug management.        Final diagnoses:   Syncope, unspecified syncope type   Nausea and vomiting, unspecified vomiting type   Elevated troponin   Hematuria, unspecified type   Hypocalcemia       ED Disposition  ED Disposition       ED Disposition   Discharge    Condition   Stable    Comment   --           DISCHARGE    Patient discharged in stable condition.    Reviewed implications of results, diagnosis, meds, responsibility to follow up, warning signs and symptoms of possible worsening, potential complications and reasons to return to ER.    Patient/Family voiced understanding of above instructions.    Discussed plan for discharge, as there is no emergent indication for admission.  Pt/family is agreeable and understands need for follow up and possible repeat testing.  Pt/family is aware that discharge does not mean that nothing is wrong but that it indicates no emergency is currently present that  requires admission and they must continue care with follow-up as given below or with a physician of their choice.     FOLLOW-UP  BeRanjit celeste MD  [address]  [id number]  [place] [id number] [phone]    Schedule an appointment as soon as possible for a visit       [facility] EMERGENCY DEPARTMENT  [address]  [facility] [id number] [phone]    If symptoms worsen    [facility] CARDIOLOGY  [address]  [address]  [facility] [id number] [phone]  Schedule an appointment as soon as possible for a visit            Medication List        New Prescriptions      rivaroxaban 20 MG tablet  Commonly known as: XARELTO  Take 1 tablet by mouth Daily With Dinner.            Stop      apixaban 5 MG tablet tablet  Commonly known as: ELIQUIS               Where to Get Your Medications        These medications were sent to [id number] - [place], [place] - [address] AT [facility] - [phone]  - [phone]   [address], [facility] [id number]      Phone: [phone]   rivaroxaban 20 MG tablet            Martin Bryan DO  09/19/23 6659

## 2023-09-20 LAB
QT INTERVAL: 468 MS
QTC INTERVAL: 471 MS